# Patient Record
Sex: FEMALE | Race: WHITE | URBAN - NONMETROPOLITAN AREA
[De-identification: names, ages, dates, MRNs, and addresses within clinical notes are randomized per-mention and may not be internally consistent; named-entity substitution may affect disease eponyms.]

---

## 2017-01-02 ENCOUNTER — COMMUNICATION - GICH (OUTPATIENT)
Dept: SURGERY | Facility: OTHER | Age: 62
End: 2017-01-02

## 2017-01-09 ENCOUNTER — HISTORY (OUTPATIENT)
Dept: SURGERY | Facility: OTHER | Age: 62
End: 2017-01-09

## 2017-01-09 ENCOUNTER — OFFICE VISIT - GICH (OUTPATIENT)
Dept: SURGERY | Facility: OTHER | Age: 62
End: 2017-01-09

## 2017-01-09 ENCOUNTER — ANTICOAGULATION - GICH (OUTPATIENT)
Dept: INTERNAL MEDICINE | Facility: OTHER | Age: 62
End: 2017-01-09

## 2017-01-09 DIAGNOSIS — I83.029 VARICOSE VEINS OF LEFT LOWER EXTREMITY WITH ULCER (H): ICD-10-CM

## 2017-01-09 DIAGNOSIS — L97.929 VARICOSE VEINS OF LEFT LOWER EXTREMITY WITH ULCER (H): ICD-10-CM

## 2017-01-09 DIAGNOSIS — Z79.01 LONG TERM CURRENT USE OF ANTICOAGULANT: ICD-10-CM

## 2017-01-09 LAB — INR - HISTORICAL: 2.2

## 2017-01-09 ASSESSMENT — PATIENT HEALTH QUESTIONNAIRE - PHQ9: SUM OF ALL RESPONSES TO PHQ QUESTIONS 1-9: 7

## 2017-01-25 ENCOUNTER — COMMUNICATION - GICH (OUTPATIENT)
Dept: SURGERY | Facility: OTHER | Age: 62
End: 2017-01-25

## 2017-01-30 ENCOUNTER — COMMUNICATION - GICH (OUTPATIENT)
Dept: SURGERY | Facility: OTHER | Age: 62
End: 2017-01-30

## 2017-02-01 ENCOUNTER — OFFICE VISIT - GICH (OUTPATIENT)
Dept: SURGERY | Facility: OTHER | Age: 62
End: 2017-02-01

## 2017-02-01 ENCOUNTER — ANTICOAGULATION - GICH (OUTPATIENT)
Dept: INTERNAL MEDICINE | Facility: OTHER | Age: 62
End: 2017-02-01

## 2017-02-01 ENCOUNTER — COMMUNICATION - GICH (OUTPATIENT)
Dept: FAMILY MEDICINE | Facility: OTHER | Age: 62
End: 2017-02-01

## 2017-02-01 ENCOUNTER — HISTORY (OUTPATIENT)
Dept: SURGERY | Facility: OTHER | Age: 62
End: 2017-02-01

## 2017-02-01 DIAGNOSIS — I82.403 ACUTE EMBOLISM AND THROMBOSIS OF DEEP VEIN OF BOTH LOWER EXTREMITIES (H): ICD-10-CM

## 2017-02-01 DIAGNOSIS — I87.8 OTHER SPECIFIED DISORDERS OF VEINS: ICD-10-CM

## 2017-02-01 DIAGNOSIS — Z79.01 LONG TERM CURRENT USE OF ANTICOAGULANT: ICD-10-CM

## 2017-02-01 LAB — INR - HISTORICAL: 1.6

## 2017-02-01 ASSESSMENT — PATIENT HEALTH QUESTIONNAIRE - PHQ9: SUM OF ALL RESPONSES TO PHQ QUESTIONS 1-9: 0

## 2017-02-15 ENCOUNTER — HOSPITAL ENCOUNTER (OUTPATIENT)
Dept: RADIOLOGY | Facility: OTHER | Age: 62
End: 2017-02-15
Attending: FAMILY MEDICINE

## 2017-02-15 ENCOUNTER — OFFICE VISIT - GICH (OUTPATIENT)
Dept: FAMILY MEDICINE | Facility: OTHER | Age: 62
End: 2017-02-15

## 2017-02-15 ENCOUNTER — ANTICOAGULATION - GICH (OUTPATIENT)
Dept: INTERNAL MEDICINE | Facility: OTHER | Age: 62
End: 2017-02-15

## 2017-02-15 ENCOUNTER — HISTORY (OUTPATIENT)
Dept: FAMILY MEDICINE | Facility: OTHER | Age: 62
End: 2017-02-15

## 2017-02-15 DIAGNOSIS — I82.4Y3 ACUTE EMBOLISM AND THROMBOSIS OF DEEP VEIN OF BOTH PROXIMAL LOWER EXTREMITIES (H): ICD-10-CM

## 2017-02-15 DIAGNOSIS — R79.1 ABNORMAL COAGULATION PROFILE: ICD-10-CM

## 2017-02-15 DIAGNOSIS — I77.6 ARTERITIS (H): ICD-10-CM

## 2017-02-15 DIAGNOSIS — Z79.01 LONG TERM CURRENT USE OF ANTICOAGULANT: ICD-10-CM

## 2017-02-15 DIAGNOSIS — M21.372 LEFT FOOT DROP: ICD-10-CM

## 2017-02-15 DIAGNOSIS — Z86.2 PERSONAL HISTORY OF DISEASES OF THE BLOOD AND BLOOD-FORMING ORGANS AND CERTAIN DISORDERS INVOLVING THE IMMUNE MECHANISM (CODE): ICD-10-CM

## 2017-02-15 DIAGNOSIS — M21.371 RIGHT FOOT DROP: ICD-10-CM

## 2017-02-15 DIAGNOSIS — S69.92XA UNSPECIFIED INJURY OF LEFT WRIST, HAND AND FINGER(S), INITIAL ENCOUNTER: ICD-10-CM

## 2017-02-15 DIAGNOSIS — I83.029 VARICOSE VEINS OF LEFT LOWER EXTREMITY WITH ULCER (H): ICD-10-CM

## 2017-02-15 DIAGNOSIS — M18.30 POST-TRAUMATIC OSTEOARTHRITIS OF FIRST CARPOMETACARPAL JOINT OF ONE HAND: ICD-10-CM

## 2017-02-15 DIAGNOSIS — G89.4 CHRONIC PAIN SYNDROME: ICD-10-CM

## 2017-02-15 DIAGNOSIS — L97.929 VARICOSE VEINS OF LEFT LOWER EXTREMITY WITH ULCER (H): ICD-10-CM

## 2017-02-15 DIAGNOSIS — I82.403 ACUTE EMBOLISM AND THROMBOSIS OF DEEP VEIN OF BOTH LOWER EXTREMITIES (H): ICD-10-CM

## 2017-02-15 LAB
ABSOLUTE BASOPHILS - HISTORICAL: 0.1 THOU/CU MM
ABSOLUTE EOSINOPHILS - HISTORICAL: 0.1 THOU/CU MM
ABSOLUTE LYMPHOCYTES - HISTORICAL: 2.1 THOU/CU MM (ref 0.9–2.9)
ABSOLUTE MONOCYTES - HISTORICAL: 0.7 THOU/CU MM
ABSOLUTE NEUTROPHILS - HISTORICAL: 6.9 THOU/CU MM (ref 1.7–7)
BASOPHILS # BLD AUTO: 1.1 %
C-REACTIVE PROTEIN - HISTORICAL: <1 MG/DL
EOSINOPHIL NFR BLD AUTO: 1.5 %
ERYTHROCYTE [DISTWIDTH] IN BLOOD BY AUTOMATED COUNT: 13.5 % (ref 11.5–15.5)
ERYTHROCYTE [SEDIMENTATION RATE] IN BLOOD: 8 MM/HR
HCT VFR BLD AUTO: 46.6 % (ref 33–51)
HEMOGLOBIN: 14.9 G/DL (ref 12–16)
INR - HISTORICAL: 6.4
INR - HISTORICAL: 7.1
LYMPHOCYTES NFR BLD AUTO: 21.1 % (ref 20–44)
MCH RBC QN AUTO: 29.7 PG (ref 26–34)
MCHC RBC AUTO-ENTMCNC: 32 G/DL (ref 32–36)
MCV RBC AUTO: 93 FL (ref 80–100)
MONOCYTES NFR BLD AUTO: 6.7 %
NEUTROPHILS NFR BLD AUTO: 69.6 % (ref 42–72)
PLATELET # BLD AUTO: 360 THOU/CU MM (ref 140–440)
PMV BLD: 6.5 FL (ref 6.5–11)
PROTIME - HISTORICAL: 80.6 SEC (ref 11.9–15.2)
RED BLOOD COUNT - HISTORICAL: 5.02 MIL/CU MM (ref 4–5.2)
WHITE BLOOD COUNT - HISTORICAL: 9.9 THOU/CU MM (ref 4.5–11)

## 2017-02-20 ENCOUNTER — ANTICOAGULATION - GICH (OUTPATIENT)
Dept: INTERNAL MEDICINE | Facility: OTHER | Age: 62
End: 2017-02-20

## 2017-02-20 DIAGNOSIS — Z79.01 LONG TERM CURRENT USE OF ANTICOAGULANT: ICD-10-CM

## 2017-02-20 LAB — INR - HISTORICAL: 1

## 2017-02-27 ENCOUNTER — ANTICOAGULATION - GICH (OUTPATIENT)
Dept: INTERNAL MEDICINE | Facility: OTHER | Age: 62
End: 2017-02-27

## 2017-02-27 DIAGNOSIS — I82.403 ACUTE EMBOLISM AND THROMBOSIS OF DEEP VEIN OF BOTH LOWER EXTREMITIES (H): ICD-10-CM

## 2017-02-27 DIAGNOSIS — Z79.01 LONG TERM CURRENT USE OF ANTICOAGULANT: ICD-10-CM

## 2017-02-27 LAB — INR - HISTORICAL: 1.3

## 2017-03-02 ENCOUNTER — OFFICE VISIT - GICH (OUTPATIENT)
Dept: FAMILY MEDICINE | Facility: OTHER | Age: 62
End: 2017-03-02

## 2017-03-02 ENCOUNTER — HISTORY (OUTPATIENT)
Dept: FAMILY MEDICINE | Facility: OTHER | Age: 62
End: 2017-03-02

## 2017-03-02 DIAGNOSIS — Z79.01 LONG TERM CURRENT USE OF ANTICOAGULANT: ICD-10-CM

## 2017-03-02 DIAGNOSIS — M21.372 LEFT FOOT DROP: ICD-10-CM

## 2017-03-02 DIAGNOSIS — I82.4Y3 ACUTE EMBOLISM AND THROMBOSIS OF DEEP VEIN OF BOTH PROXIMAL LOWER EXTREMITIES (H): ICD-10-CM

## 2017-03-07 ENCOUNTER — ANTICOAGULATION - GICH (OUTPATIENT)
Dept: INTERNAL MEDICINE | Facility: OTHER | Age: 62
End: 2017-03-07

## 2017-03-07 DIAGNOSIS — Z79.01 LONG TERM CURRENT USE OF ANTICOAGULANT: ICD-10-CM

## 2017-03-07 DIAGNOSIS — I82.4Y3 ACUTE EMBOLISM AND THROMBOSIS OF DEEP VEIN OF BOTH PROXIMAL LOWER EXTREMITIES (H): ICD-10-CM

## 2017-03-07 LAB — INR - HISTORICAL: 4.2

## 2017-03-10 ENCOUNTER — AMBULATORY - GICH (OUTPATIENT)
Dept: SCHEDULING | Facility: OTHER | Age: 62
End: 2017-03-10

## 2017-03-17 ENCOUNTER — ANTICOAGULATION - GICH (OUTPATIENT)
Dept: INTERNAL MEDICINE | Facility: OTHER | Age: 62
End: 2017-03-17

## 2017-03-17 DIAGNOSIS — Z79.01 LONG TERM CURRENT USE OF ANTICOAGULANT: ICD-10-CM

## 2017-03-17 DIAGNOSIS — I82.4Y3 ACUTE EMBOLISM AND THROMBOSIS OF DEEP VEIN OF BOTH PROXIMAL LOWER EXTREMITIES (H): ICD-10-CM

## 2017-03-17 LAB — INR - HISTORICAL: 1.9

## 2017-03-28 ENCOUNTER — COMMUNICATION - GICH (OUTPATIENT)
Dept: FAMILY MEDICINE | Facility: OTHER | Age: 62
End: 2017-03-28

## 2017-03-28 DIAGNOSIS — K52.9 NONINFECTIVE GASTROENTERITIS AND COLITIS: ICD-10-CM

## 2017-04-04 ENCOUNTER — HISTORY (OUTPATIENT)
Dept: FAMILY MEDICINE | Facility: OTHER | Age: 62
End: 2017-04-04

## 2017-04-04 ENCOUNTER — ANTICOAGULATION - GICH (OUTPATIENT)
Dept: INTERNAL MEDICINE | Facility: OTHER | Age: 62
End: 2017-04-04

## 2017-04-04 ENCOUNTER — OFFICE VISIT - GICH (OUTPATIENT)
Dept: FAMILY MEDICINE | Facility: OTHER | Age: 62
End: 2017-04-04

## 2017-04-04 DIAGNOSIS — Z79.01 LONG TERM CURRENT USE OF ANTICOAGULANT: ICD-10-CM

## 2017-04-04 DIAGNOSIS — G89.4 CHRONIC PAIN SYNDROME: ICD-10-CM

## 2017-04-04 DIAGNOSIS — I77.6 ARTERITIS (H): ICD-10-CM

## 2017-04-04 DIAGNOSIS — F41.9 ANXIETY DISORDER: ICD-10-CM

## 2017-04-04 DIAGNOSIS — I82.4Y3 ACUTE EMBOLISM AND THROMBOSIS OF DEEP VEIN OF BOTH PROXIMAL LOWER EXTREMITIES (H): ICD-10-CM

## 2017-04-04 DIAGNOSIS — I82.403 ACUTE EMBOLISM AND THROMBOSIS OF DEEP VEIN OF BOTH LOWER EXTREMITIES (H): ICD-10-CM

## 2017-04-04 DIAGNOSIS — K21.9 GASTRO-ESOPHAGEAL REFLUX DISEASE WITHOUT ESOPHAGITIS: ICD-10-CM

## 2017-04-04 LAB — INR - HISTORICAL: 3.2

## 2017-04-05 ENCOUNTER — OFFICE VISIT - GICH (OUTPATIENT)
Dept: SURGERY | Facility: OTHER | Age: 62
End: 2017-04-05

## 2017-04-05 DIAGNOSIS — I83.029 VARICOSE VEINS OF LEFT LOWER EXTREMITY WITH ULCER (H): ICD-10-CM

## 2017-04-05 DIAGNOSIS — L03.116 CELLULITIS OF LEFT LOWER EXTREMITY: ICD-10-CM

## 2017-04-05 DIAGNOSIS — L97.929 VARICOSE VEINS OF LEFT LOWER EXTREMITY WITH ULCER (H): ICD-10-CM

## 2017-04-05 DIAGNOSIS — L97.929 NON-PRESSURE CHRONIC ULCER OF LEFT LOWER LEG (H): ICD-10-CM

## 2017-04-07 LAB
CULTURE - HISTORICAL: ABNORMAL
CULTURE - HISTORICAL: ABNORMAL
GRAM STAIN: ABNORMAL
SPECIMEN DESCRIPTION - HISTORICAL: ABNORMAL
SUSCEPTIBILITY RESULT - HISTORICAL: ABNORMAL

## 2017-04-10 ENCOUNTER — AMBULATORY - GICH (OUTPATIENT)
Dept: SCHEDULING | Facility: OTHER | Age: 62
End: 2017-04-10

## 2017-04-10 ENCOUNTER — OFFICE VISIT - GICH (OUTPATIENT)
Dept: SURGERY | Facility: OTHER | Age: 62
End: 2017-04-10

## 2017-04-10 ENCOUNTER — COMMUNICATION - GICH (OUTPATIENT)
Dept: FAMILY MEDICINE | Facility: OTHER | Age: 62
End: 2017-04-10

## 2017-04-10 ENCOUNTER — HISTORY (OUTPATIENT)
Dept: SURGERY | Facility: OTHER | Age: 62
End: 2017-04-10

## 2017-04-10 DIAGNOSIS — I83.029 VARICOSE VEINS OF LEFT LOWER EXTREMITY WITH ULCER (H): ICD-10-CM

## 2017-04-10 DIAGNOSIS — L97.929 VARICOSE VEINS OF LEFT LOWER EXTREMITY WITH ULCER (H): ICD-10-CM

## 2017-04-10 DIAGNOSIS — F32.9 MAJOR DEPRESSIVE DISORDER, SINGLE EPISODE: ICD-10-CM

## 2017-04-14 ENCOUNTER — AMBULATORY - GICH (OUTPATIENT)
Dept: SCHEDULING | Facility: OTHER | Age: 62
End: 2017-04-14

## 2017-04-16 ENCOUNTER — HISTORY (OUTPATIENT)
Dept: EMERGENCY MEDICINE | Facility: OTHER | Age: 62
End: 2017-04-16

## 2017-04-17 ENCOUNTER — HISTORY (OUTPATIENT)
Dept: INTENSIVE CARE | Facility: OTHER | Age: 62
End: 2017-04-17

## 2017-04-23 ENCOUNTER — COMMUNICATION - GICH (OUTPATIENT)
Dept: FAMILY MEDICINE | Facility: OTHER | Age: 62
End: 2017-04-23

## 2017-04-23 DIAGNOSIS — R60.9 EDEMA: ICD-10-CM

## 2017-04-23 DIAGNOSIS — K52.9 NONINFECTIVE GASTROENTERITIS AND COLITIS: ICD-10-CM

## 2017-04-23 DIAGNOSIS — F32.9 MAJOR DEPRESSIVE DISORDER, SINGLE EPISODE: ICD-10-CM

## 2017-04-23 DIAGNOSIS — K21.9 GASTRO-ESOPHAGEAL REFLUX DISEASE WITHOUT ESOPHAGITIS: ICD-10-CM

## 2017-04-24 ENCOUNTER — COMMUNICATION - GICH (OUTPATIENT)
Dept: FAMILY MEDICINE | Facility: OTHER | Age: 62
End: 2017-04-24

## 2017-05-01 ENCOUNTER — OFFICE VISIT - GICH (OUTPATIENT)
Dept: FAMILY MEDICINE | Facility: OTHER | Age: 62
End: 2017-05-01

## 2017-05-01 ENCOUNTER — OFFICE VISIT - GICH (OUTPATIENT)
Dept: SURGERY | Facility: OTHER | Age: 62
End: 2017-05-01

## 2017-05-01 ENCOUNTER — HISTORY (OUTPATIENT)
Dept: SURGERY | Facility: OTHER | Age: 62
End: 2017-05-01

## 2017-05-01 ENCOUNTER — HISTORY (OUTPATIENT)
Dept: FAMILY MEDICINE | Facility: OTHER | Age: 62
End: 2017-05-01

## 2017-05-01 ENCOUNTER — AMBULATORY - GICH (OUTPATIENT)
Dept: SCHEDULING | Facility: OTHER | Age: 62
End: 2017-05-01

## 2017-05-01 ENCOUNTER — ANTICOAGULATION - GICH (OUTPATIENT)
Dept: FAMILY MEDICINE | Facility: OTHER | Age: 62
End: 2017-05-01

## 2017-05-01 DIAGNOSIS — R00.0 TACHYCARDIA: ICD-10-CM

## 2017-05-01 DIAGNOSIS — L97.929 VARICOSE VEINS OF LEFT LOWER EXTREMITY WITH ULCER (H): ICD-10-CM

## 2017-05-01 DIAGNOSIS — T79.6XXS TRAUMATIC ISCHEMIA OF MUSCLE, SEQUELA: ICD-10-CM

## 2017-05-01 DIAGNOSIS — I82.403 ACUTE EMBOLISM AND THROMBOSIS OF DEEP VEIN OF BOTH LOWER EXTREMITIES (H): ICD-10-CM

## 2017-05-01 DIAGNOSIS — E87.1 HYPO-OSMOLALITY AND HYPONATREMIA (CODE): ICD-10-CM

## 2017-05-01 DIAGNOSIS — Z79.01 LONG TERM CURRENT USE OF ANTICOAGULANT: ICD-10-CM

## 2017-05-01 DIAGNOSIS — I82.4Y3 ACUTE EMBOLISM AND THROMBOSIS OF DEEP VEIN OF BOTH PROXIMAL LOWER EXTREMITIES (H): ICD-10-CM

## 2017-05-01 DIAGNOSIS — I83.029 VARICOSE VEINS OF LEFT LOWER EXTREMITY WITH ULCER (H): ICD-10-CM

## 2017-05-01 LAB
ANION GAP - HISTORICAL: 7 (ref 5–18)
BUN SERPL-MCNC: 15 MG/DL (ref 7–25)
BUN/CREAT RATIO - HISTORICAL: 22
CALCIUM SERPL-MCNC: 9.4 MG/DL (ref 8.6–10.3)
CHLORIDE SERPLBLD-SCNC: 102 MMOL/L (ref 98–107)
CK SERPL-CCNC: 112 IU/L (ref 30–223)
CO2 SERPL-SCNC: 26 MMOL/L (ref 21–31)
CREAT SERPL-MCNC: 0.67 MG/DL (ref 0.7–1.3)
GFR IF NOT AFRICAN AMERICAN - HISTORICAL: >60 ML/MIN/1.73M2
GLUCOSE SERPL-MCNC: 104 MG/DL (ref 70–105)
INR - HISTORICAL: 1.6
MAGNESIUM SERPL-MCNC: 1.7 MG/DL (ref 1.9–2.7)
POTASSIUM SERPL-SCNC: 4.8 MMOL/L (ref 3.5–5.1)
PROTIME - HISTORICAL: 17.4 SEC (ref 11.9–15.2)
SODIUM SERPL-SCNC: 135 MMOL/L (ref 133–143)

## 2017-05-02 ENCOUNTER — COMMUNICATION - GICH (OUTPATIENT)
Dept: FAMILY MEDICINE | Facility: OTHER | Age: 62
End: 2017-05-02

## 2017-05-08 ENCOUNTER — OFFICE VISIT - GICH (OUTPATIENT)
Dept: SURGERY | Facility: OTHER | Age: 62
End: 2017-05-08

## 2017-05-08 ENCOUNTER — COMMUNICATION - GICH (OUTPATIENT)
Dept: FAMILY MEDICINE | Facility: OTHER | Age: 62
End: 2017-05-08

## 2017-05-08 ENCOUNTER — ANTICOAGULATION - GICH (OUTPATIENT)
Dept: INTERNAL MEDICINE | Facility: OTHER | Age: 62
End: 2017-05-08

## 2017-05-08 ENCOUNTER — HISTORY (OUTPATIENT)
Dept: SURGERY | Facility: OTHER | Age: 62
End: 2017-05-08

## 2017-05-08 DIAGNOSIS — I82.4Y3 ACUTE EMBOLISM AND THROMBOSIS OF DEEP VEIN OF BOTH PROXIMAL LOWER EXTREMITIES (H): ICD-10-CM

## 2017-05-08 DIAGNOSIS — R00.0 TACHYCARDIA: ICD-10-CM

## 2017-05-08 DIAGNOSIS — L97.929 VARICOSE VEINS OF LEFT LOWER EXTREMITY WITH ULCER (H): ICD-10-CM

## 2017-05-08 DIAGNOSIS — I83.029 VARICOSE VEINS OF LEFT LOWER EXTREMITY WITH ULCER (H): ICD-10-CM

## 2017-05-08 DIAGNOSIS — I82.403 ACUTE EMBOLISM AND THROMBOSIS OF DEEP VEIN OF BOTH LOWER EXTREMITIES (H): ICD-10-CM

## 2017-05-08 DIAGNOSIS — Z79.01 LONG TERM CURRENT USE OF ANTICOAGULANT: ICD-10-CM

## 2017-05-08 LAB — INR - HISTORICAL: 4.1

## 2017-05-09 ENCOUNTER — COMMUNICATION - GICH (OUTPATIENT)
Dept: FAMILY MEDICINE | Facility: OTHER | Age: 62
End: 2017-05-09

## 2017-05-15 ENCOUNTER — OFFICE VISIT - GICH (OUTPATIENT)
Dept: SURGERY | Facility: OTHER | Age: 62
End: 2017-05-15

## 2017-05-15 ENCOUNTER — HISTORY (OUTPATIENT)
Dept: SURGERY | Facility: OTHER | Age: 62
End: 2017-05-15

## 2017-05-15 ENCOUNTER — AMBULATORY - GICH (OUTPATIENT)
Dept: SCHEDULING | Facility: OTHER | Age: 62
End: 2017-05-15

## 2017-05-15 DIAGNOSIS — L97.929 VARICOSE VEINS OF LEFT LOWER EXTREMITY WITH ULCER (H): ICD-10-CM

## 2017-05-15 DIAGNOSIS — I83.029 VARICOSE VEINS OF LEFT LOWER EXTREMITY WITH ULCER (H): ICD-10-CM

## 2017-05-16 ENCOUNTER — HOSPITAL - GICH (OUTPATIENT)
Dept: LAB | Facility: OTHER | Age: 62
End: 2017-05-16

## 2017-05-16 ENCOUNTER — ANTICOAGULATION - GICH (OUTPATIENT)
Dept: FAMILY MEDICINE | Facility: OTHER | Age: 62
End: 2017-05-16

## 2017-05-16 DIAGNOSIS — Z00.00 ENCOUNTER FOR GENERAL ADULT MEDICAL EXAMINATION WITHOUT ABNORMAL FINDINGS: ICD-10-CM

## 2017-05-16 DIAGNOSIS — I82.403 ACUTE EMBOLISM AND THROMBOSIS OF DEEP VEIN OF BOTH LOWER EXTREMITIES (H): ICD-10-CM

## 2017-05-16 DIAGNOSIS — Z79.01 LONG TERM CURRENT USE OF ANTICOAGULANT: ICD-10-CM

## 2017-05-16 LAB
INR - HISTORICAL: 5.4
PROTIME - HISTORICAL: 60.5 SEC (ref 11.9–15.2)

## 2017-05-18 ENCOUNTER — AMBULATORY - GICH (OUTPATIENT)
Dept: SCHEDULING | Facility: OTHER | Age: 62
End: 2017-05-18

## 2017-05-18 ENCOUNTER — HOSPITAL - GICH (OUTPATIENT)
Dept: LAB | Facility: OTHER | Age: 62
End: 2017-05-18

## 2017-05-18 ENCOUNTER — ANTICOAGULATION - GICH (OUTPATIENT)
Dept: FAMILY MEDICINE | Facility: OTHER | Age: 62
End: 2017-05-18

## 2017-05-18 DIAGNOSIS — I82.403 ACUTE EMBOLISM AND THROMBOSIS OF DEEP VEIN OF BOTH LOWER EXTREMITIES (H): ICD-10-CM

## 2017-05-18 DIAGNOSIS — Z79.01 LONG TERM CURRENT USE OF ANTICOAGULANT: ICD-10-CM

## 2017-05-18 DIAGNOSIS — I82.4Y3 ACUTE EMBOLISM AND THROMBOSIS OF DEEP VEIN OF BOTH PROXIMAL LOWER EXTREMITIES (H): ICD-10-CM

## 2017-05-18 LAB
INR - HISTORICAL: 2.1
PROTIME - HISTORICAL: 22.7 SEC (ref 11.9–15.2)

## 2017-05-23 ENCOUNTER — HOSPITAL - GICH (OUTPATIENT)
Dept: LAB | Facility: OTHER | Age: 62
End: 2017-05-23

## 2017-05-23 ENCOUNTER — ANTICOAGULATION - GICH (OUTPATIENT)
Dept: FAMILY MEDICINE | Facility: OTHER | Age: 62
End: 2017-05-23

## 2017-05-23 ENCOUNTER — AMBULATORY - GICH (OUTPATIENT)
Dept: SCHEDULING | Facility: OTHER | Age: 62
End: 2017-05-23

## 2017-05-23 DIAGNOSIS — Z79.01 LONG TERM CURRENT USE OF ANTICOAGULANT: ICD-10-CM

## 2017-05-23 DIAGNOSIS — I82.4Y3 ACUTE EMBOLISM AND THROMBOSIS OF DEEP VEIN OF BOTH PROXIMAL LOWER EXTREMITIES (H): ICD-10-CM

## 2017-05-23 DIAGNOSIS — I77.3 ARTERIAL FIBROMUSCULAR DYSPLASIA (H): ICD-10-CM

## 2017-05-23 DIAGNOSIS — I82.403 ACUTE EMBOLISM AND THROMBOSIS OF DEEP VEIN OF BOTH LOWER EXTREMITIES (H): ICD-10-CM

## 2017-05-23 LAB
INR - HISTORICAL: 4.5
PROTIME - HISTORICAL: 50.1 SEC (ref 11.9–15.2)

## 2017-05-24 ENCOUNTER — HISTORY (OUTPATIENT)
Dept: INTERNAL MEDICINE | Facility: OTHER | Age: 62
End: 2017-05-24

## 2017-05-24 ENCOUNTER — OFFICE VISIT - GICH (OUTPATIENT)
Dept: INTERNAL MEDICINE | Facility: OTHER | Age: 62
End: 2017-05-24

## 2017-05-24 DIAGNOSIS — I87.8 OTHER SPECIFIED DISORDERS OF VEINS: ICD-10-CM

## 2017-05-24 DIAGNOSIS — L97.922 NON-PRESSURE CHRONIC ULCER OF LEFT LOWER LEG WITH FAT LAYER EXPOSED (H): ICD-10-CM

## 2017-05-31 ENCOUNTER — OFFICE VISIT - GICH (OUTPATIENT)
Dept: FAMILY MEDICINE | Facility: OTHER | Age: 62
End: 2017-05-31

## 2017-05-31 ENCOUNTER — HISTORY (OUTPATIENT)
Dept: SURGERY | Facility: OTHER | Age: 62
End: 2017-05-31

## 2017-05-31 ENCOUNTER — ANTICOAGULATION - GICH (OUTPATIENT)
Dept: INTERNAL MEDICINE | Facility: OTHER | Age: 62
End: 2017-05-31

## 2017-05-31 ENCOUNTER — HISTORY (OUTPATIENT)
Dept: FAMILY MEDICINE | Facility: OTHER | Age: 62
End: 2017-05-31

## 2017-05-31 ENCOUNTER — OFFICE VISIT - GICH (OUTPATIENT)
Dept: SURGERY | Facility: OTHER | Age: 62
End: 2017-05-31

## 2017-05-31 DIAGNOSIS — I77.6 ARTERITIS (H): ICD-10-CM

## 2017-05-31 DIAGNOSIS — I82.403 ACUTE EMBOLISM AND THROMBOSIS OF DEEP VEIN OF BOTH LOWER EXTREMITIES (H): ICD-10-CM

## 2017-05-31 DIAGNOSIS — L97.929 VARICOSE VEINS OF LEFT LOWER EXTREMITY WITH ULCER (H): ICD-10-CM

## 2017-05-31 DIAGNOSIS — R60.9 EDEMA: ICD-10-CM

## 2017-05-31 DIAGNOSIS — Z02.89 ENCOUNTER FOR OTHER ADMINISTRATIVE EXAMINATIONS: ICD-10-CM

## 2017-05-31 DIAGNOSIS — K21.9 GASTRO-ESOPHAGEAL REFLUX DISEASE WITHOUT ESOPHAGITIS: ICD-10-CM

## 2017-05-31 DIAGNOSIS — F41.9 ANXIETY DISORDER: ICD-10-CM

## 2017-05-31 DIAGNOSIS — F32.9 MAJOR DEPRESSIVE DISORDER, SINGLE EPISODE: ICD-10-CM

## 2017-05-31 DIAGNOSIS — Z79.01 LONG TERM CURRENT USE OF ANTICOAGULANT: ICD-10-CM

## 2017-05-31 DIAGNOSIS — I83.029 VARICOSE VEINS OF LEFT LOWER EXTREMITY WITH ULCER (H): ICD-10-CM

## 2017-05-31 DIAGNOSIS — Z86.2 PERSONAL HISTORY OF DISEASES OF THE BLOOD AND BLOOD-FORMING ORGANS AND CERTAIN DISORDERS INVOLVING THE IMMUNE MECHANISM (CODE): ICD-10-CM

## 2017-05-31 LAB
INR - HISTORICAL: 8.8
PROTIME - HISTORICAL: 101.6 SEC (ref 11.9–15.2)

## 2017-06-01 ENCOUNTER — HOSPITAL ENCOUNTER (OUTPATIENT)
Dept: MEDSURG UNIT | Facility: OTHER | Age: 62
Setting detail: OBSERVATION
Discharge: NURSING, REHAB OR LONG-TERM CARE | End: 2017-06-03
Attending: FAMILY MEDICINE | Admitting: INTERNAL MEDICINE

## 2017-06-01 ENCOUNTER — HISTORY (OUTPATIENT)
Dept: MEDSURG UNIT | Facility: OTHER | Age: 62
End: 2017-06-01

## 2017-06-01 DIAGNOSIS — F32.9 MAJOR DEPRESSIVE DISORDER, SINGLE EPISODE: ICD-10-CM
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
## 2017-06-01 DIAGNOSIS — I82.4Y3 ACUTE EMBOLISM AND THROMBOSIS OF DEEP VEIN OF BOTH PROXIMAL LOWER EXTREMITIES (H): ICD-10-CM

## 2017-06-01 DIAGNOSIS — R79.1 ABNORMAL COAGULATION PROFILE: ICD-10-CM

## 2017-06-01 DIAGNOSIS — I77.6 ARTERITIS (H): ICD-10-CM
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
## 2017-06-01 DIAGNOSIS — Z79.01 LONG TERM CURRENT USE OF ANTICOAGULANT: ICD-10-CM

## 2017-06-01 DIAGNOSIS — M79.605 PAIN OF LEFT LEG: ICD-10-CM

## 2017-06-01 DIAGNOSIS — I47.9 PAROXYSMAL TACHYCARDIA (H): ICD-10-CM

## 2017-06-01 DIAGNOSIS — K52.9 NONINFECTIVE GASTROENTERITIS AND COLITIS: ICD-10-CM

## 2017-06-01 DIAGNOSIS — F41.9 ANXIETY DISORDER: ICD-10-CM

## 2017-06-01 LAB
A/G RATIO - HISTORICAL: 1.3 (ref 1–2)
ABSOLUTE BASOPHILS - HISTORICAL: 0.1 THOU/CU MM
ABSOLUTE EOSINOPHILS - HISTORICAL: 0.1 THOU/CU MM
ABSOLUTE LYMPHOCYTES - HISTORICAL: 1.3 THOU/CU MM (ref 0.9–2.9)
ABSOLUTE MONOCYTES - HISTORICAL: 0.7 THOU/CU MM
ABSOLUTE NEUTROPHILS - HISTORICAL: 6.2 THOU/CU MM (ref 1.7–7)
ALBUMIN SERPL-MCNC: 4 G/DL (ref 3.5–5.7)
ALP SERPL-CCNC: 104 IU/L (ref 34–104)
ALT (SGPT) - HISTORICAL: 30 IU/L (ref 7–52)
AMPHETAMINES QUAL: ABNORMAL
ANION GAP - HISTORICAL: 10 (ref 5–18)
AST SERPL-CCNC: 58 IU/L (ref 13–39)
BACTERIA URINE: ABNORMAL BACTERIA/HPF
BARBITURATES QUAL UR: NOT DETECTED
BASOPHILS # BLD AUTO: 0.6 %
BENZODIAZEPINES QUAL: ABNORMAL
BILIRUB SERPL-MCNC: 0.5 MG/DL (ref 0.3–1)
BILIRUB UR QL: NEGATIVE
BUN SERPL-MCNC: 24 MG/DL (ref 7–25)
BUN/CREAT RATIO - HISTORICAL: 33
BUPRENORPHINE QUAL URINE: NOT DETECTED
CALCIUM SERPL-MCNC: 9.5 MG/DL (ref 8.6–10.3)
CHLORIDE SERPLBLD-SCNC: 100 MMOL/L (ref 98–107)
CLARITY, URINE: CLEAR CLARITY
CO2 SERPL-SCNC: 22 MMOL/L (ref 21–31)
COCAINE QUAL: NOT DETECTED
COLOR UR: YELLOW COLOR
CREAT SERPL-MCNC: 0.72 MG/DL (ref 0.7–1.3)
EOSINOPHIL NFR BLD AUTO: 0.6 %
EPITHELIAL CELLS: ABNORMAL EPI/HPF
ERYTHROCYTE [DISTWIDTH] IN BLOOD BY AUTOMATED COUNT: 14.1 % (ref 11.5–15.5)
ETHANOL: <0.01 G/DL
GFR IF NOT AFRICAN AMERICAN - HISTORICAL: >60 ML/MIN/1.73M2
GLOBULIN - HISTORICAL: 3.1 G/DL (ref 2–3.7)
GLUCOSE SERPL-MCNC: 133 MG/DL (ref 70–105)
GLUCOSE URINE: NEGATIVE MG/DL
HCT VFR BLD AUTO: 42.7 % (ref 33–51)
HEMOGLOBIN: 14.4 G/DL (ref 12–16)
INR - HISTORICAL: 8.4
KETONES UR QL: 15 MG/DL
LEUKOCYTE ESTERASE URINE: NEGATIVE
LYMPHOCYTES NFR BLD AUTO: 15.5 % (ref 20–44)
Lab: <0.2 UG/ML (ref 10–30)
MCH RBC QN AUTO: 29.8 PG (ref 26–34)
MCHC RBC AUTO-ENTMCNC: 33.7 G/DL (ref 32–36)
MCV RBC AUTO: 88 FL (ref 80–100)
METHADONE QUAL URINE: NOT DETECTED
METHAMPHETAMINE QUAL URINE: NOT DETECTED
MONOCYTES NFR BLD AUTO: 8 %
NEUTROPHILS NFR BLD AUTO: 75.2 % (ref 42–72)
NITRITE UR QL STRIP: POSITIVE
OCCULT BLOOD,URINE - HISTORICAL: ABNORMAL
OPIATES QUALITATIVE URINE: ABNORMAL
OXYCODONE QUAL: ABNORMAL
PCP QUAL URINE: NOT DETECTED
PH UR: 5 [PH]
PLATELET # BLD AUTO: 311 THOU/CU MM (ref 140–440)
PMV BLD: 9.4 FL (ref 6.5–11)
POTASSIUM SERPL-SCNC: 4.1 MMOL/L (ref 3.5–5.1)
PROPOXYPHENE,QUAL - HISTORICAL: NOT DETECTED
PROT SERPL-MCNC: 7.1 G/DL (ref 6.4–8.9)
PROTEIN QUALITATIVE,URINE - HISTORICAL: NEGATIVE MG/DL
PROTIME - HISTORICAL: 96.9 SEC (ref 11.9–15.2)
RBC - HISTORICAL: ABNORMAL /HPF
RED BLOOD COUNT - HISTORICAL: 4.84 MIL/CU MM (ref 4–5.2)
SALICYLATES SERPL-MCNC: <0.2 MG/DL (ref 15–30)
SODIUM SERPL-SCNC: 132 MMOL/L (ref 133–143)
SP GR UR STRIP: 1.02
THC METABOLITES,QUAL - HISTORICAL: NOT DETECTED
TRICYC ANTI QUAL URINE: ABNORMAL
UROBILINOGEN,QUALITATIVE - HISTORICAL: NORMAL EU/DL
WBC - HISTORICAL: ABNORMAL /HPF
WHITE BLOOD COUNT - HISTORICAL: 8.3 THOU/CU MM (ref 4.5–11)

## 2017-06-02 ENCOUNTER — HISTORY (OUTPATIENT)
Dept: MEDSURG UNIT | Facility: OTHER | Age: 62
End: 2017-06-02

## 2017-06-03 ENCOUNTER — HISTORY (OUTPATIENT)
Dept: MEDSURG UNIT | Facility: OTHER | Age: 62
End: 2017-06-03

## 2017-06-03 LAB
CK SERPL-CCNC: 6990 IU/L (ref 30–223)
INR - HISTORICAL: 1.3
PROTIME - HISTORICAL: 13.3 SEC (ref 11.9–15.2)

## 2017-06-04 LAB
CULTURE - HISTORICAL: ABNORMAL
CULTURE - HISTORICAL: ABNORMAL
SUSCEPTIBILITY RESULT - HISTORICAL: ABNORMAL

## 2017-06-06 ENCOUNTER — AMBULATORY - GICH (OUTPATIENT)
Dept: SCHEDULING | Facility: OTHER | Age: 62
End: 2017-06-06

## 2017-06-06 ENCOUNTER — ANTICOAGULATION - GICH (OUTPATIENT)
Dept: FAMILY MEDICINE | Facility: OTHER | Age: 62
End: 2017-06-06

## 2017-06-06 ENCOUNTER — HOSPITAL - GICH (OUTPATIENT)
Dept: LAB | Facility: OTHER | Age: 62
End: 2017-06-06

## 2017-06-06 DIAGNOSIS — Z79.01 LONG TERM CURRENT USE OF ANTICOAGULANT: ICD-10-CM

## 2017-06-06 DIAGNOSIS — I82.403 ACUTE EMBOLISM AND THROMBOSIS OF DEEP VEIN OF BOTH LOWER EXTREMITIES (H): ICD-10-CM

## 2017-06-06 DIAGNOSIS — T79.6XXA: ICD-10-CM

## 2017-06-06 DIAGNOSIS — I82.4Y3 ACUTE EMBOLISM AND THROMBOSIS OF DEEP VEIN OF BOTH PROXIMAL LOWER EXTREMITIES (H): ICD-10-CM

## 2017-06-06 LAB
CK SERPL-CCNC: 955 IU/L (ref 30–223)
INR - HISTORICAL: 1.1
PROTIME - HISTORICAL: 11.6 SEC (ref 11.9–15.2)

## 2017-06-09 ENCOUNTER — ANTICOAGULATION - GICH (OUTPATIENT)
Dept: FAMILY MEDICINE | Facility: OTHER | Age: 62
End: 2017-06-09

## 2017-06-09 ENCOUNTER — AMBULATORY - GICH (OUTPATIENT)
Dept: SCHEDULING | Facility: OTHER | Age: 62
End: 2017-06-09

## 2017-06-09 ENCOUNTER — HOSPITAL ENCOUNTER (OUTPATIENT)
Dept: LAB | Facility: OTHER | Age: 62
Setting detail: SPECIMEN
End: 2017-06-09
Attending: FAMILY MEDICINE | Admitting: FAMILY MEDICINE

## 2017-06-09 ENCOUNTER — HOSPITAL - GICH (OUTPATIENT)
Dept: LAB | Facility: OTHER | Age: 62
End: 2017-06-09

## 2017-06-09 DIAGNOSIS — I82.403 ACUTE EMBOLISM AND THROMBOSIS OF DEEP VEIN OF BOTH LOWER EXTREMITIES (H): ICD-10-CM

## 2017-06-09 DIAGNOSIS — T79.6XXA: ICD-10-CM

## 2017-06-09 DIAGNOSIS — I82.4Y3 ACUTE EMBOLISM AND THROMBOSIS OF DEEP VEIN OF BOTH PROXIMAL LOWER EXTREMITIES (H): ICD-10-CM

## 2017-06-09 DIAGNOSIS — Z79.01 LONG TERM CURRENT USE OF ANTICOAGULANT: ICD-10-CM

## 2017-06-09 LAB
INR - HISTORICAL: 2.1
PROTIME - HISTORICAL: 22.9 SEC (ref 11.9–15.2)

## 2017-06-12 ENCOUNTER — HISTORY (OUTPATIENT)
Dept: SURGERY | Facility: OTHER | Age: 62
End: 2017-06-12

## 2017-06-12 ENCOUNTER — ANTICOAGULATION - GICH (OUTPATIENT)
Dept: INTERNAL MEDICINE | Facility: OTHER | Age: 62
End: 2017-06-12

## 2017-06-12 ENCOUNTER — HISTORY (OUTPATIENT)
Dept: FAMILY MEDICINE | Facility: OTHER | Age: 62
End: 2017-06-12

## 2017-06-12 ENCOUNTER — OFFICE VISIT - GICH (OUTPATIENT)
Dept: SURGERY | Facility: OTHER | Age: 62
End: 2017-06-12

## 2017-06-12 ENCOUNTER — OFFICE VISIT - GICH (OUTPATIENT)
Dept: FAMILY MEDICINE | Facility: OTHER | Age: 62
End: 2017-06-12

## 2017-06-12 DIAGNOSIS — L97.929 VARICOSE VEINS OF LEFT LOWER EXTREMITY WITH ULCER (H): ICD-10-CM

## 2017-06-12 DIAGNOSIS — F32.9 MAJOR DEPRESSIVE DISORDER, SINGLE EPISODE: ICD-10-CM

## 2017-06-12 DIAGNOSIS — Z79.01 LONG TERM CURRENT USE OF ANTICOAGULANT: ICD-10-CM

## 2017-06-12 DIAGNOSIS — I82.403 ACUTE EMBOLISM AND THROMBOSIS OF DEEP VEIN OF BOTH LOWER EXTREMITIES (H): ICD-10-CM

## 2017-06-12 DIAGNOSIS — T79.6XXS TRAUMATIC ISCHEMIA OF MUSCLE, SEQUELA: ICD-10-CM

## 2017-06-12 DIAGNOSIS — I77.6 ARTERITIS (H): ICD-10-CM

## 2017-06-12 DIAGNOSIS — I47.9 PAROXYSMAL TACHYCARDIA (H): ICD-10-CM

## 2017-06-12 DIAGNOSIS — I83.029 VARICOSE VEINS OF LEFT LOWER EXTREMITY WITH ULCER (H): ICD-10-CM

## 2017-06-12 DIAGNOSIS — I87.8 OTHER SPECIFIED DISORDERS OF VEINS: ICD-10-CM

## 2017-06-12 DIAGNOSIS — I82.4Y3 ACUTE EMBOLISM AND THROMBOSIS OF DEEP VEIN OF BOTH PROXIMAL LOWER EXTREMITIES (H): ICD-10-CM

## 2017-06-12 LAB — INR - HISTORICAL: 2.1

## 2017-06-13 ENCOUNTER — HISTORY (OUTPATIENT)
Dept: FAMILY MEDICINE | Facility: OTHER | Age: 62
End: 2017-06-13

## 2017-06-19 ENCOUNTER — COMMUNICATION - GICH (OUTPATIENT)
Dept: FAMILY MEDICINE | Facility: OTHER | Age: 62
End: 2017-06-19

## 2017-06-19 ENCOUNTER — HISTORY (OUTPATIENT)
Dept: SURGERY | Facility: OTHER | Age: 62
End: 2017-06-19

## 2017-06-19 ENCOUNTER — OFFICE VISIT - GICH (OUTPATIENT)
Dept: SURGERY | Facility: OTHER | Age: 62
End: 2017-06-19

## 2017-06-19 DIAGNOSIS — L97.929 VARICOSE VEINS OF LEFT LOWER EXTREMITY WITH ULCER (H): ICD-10-CM

## 2017-06-19 DIAGNOSIS — I83.029 VARICOSE VEINS OF LEFT LOWER EXTREMITY WITH ULCER (H): ICD-10-CM

## 2017-06-20 ENCOUNTER — HOSPITAL - GICH (OUTPATIENT)
Dept: LAB | Facility: OTHER | Age: 62
End: 2017-06-20

## 2017-06-20 ENCOUNTER — ANTICOAGULATION - GICH (OUTPATIENT)
Dept: FAMILY MEDICINE | Facility: OTHER | Age: 62
End: 2017-06-20

## 2017-06-20 ENCOUNTER — AMBULATORY - GICH (OUTPATIENT)
Dept: SCHEDULING | Facility: OTHER | Age: 62
End: 2017-06-20

## 2017-06-20 DIAGNOSIS — Z79.01 LONG TERM CURRENT USE OF ANTICOAGULANT: ICD-10-CM

## 2017-06-20 DIAGNOSIS — I48.20 CHRONIC ATRIAL FIBRILLATION (H): ICD-10-CM

## 2017-06-20 LAB
INR - HISTORICAL: 5.8
PROTIME - HISTORICAL: 65.8 SEC (ref 11.9–15.2)

## 2017-06-22 ENCOUNTER — AMBULATORY - GICH (OUTPATIENT)
Dept: SCHEDULING | Facility: OTHER | Age: 62
End: 2017-06-22

## 2017-06-22 ENCOUNTER — ANTICOAGULATION - GICH (OUTPATIENT)
Dept: FAMILY MEDICINE | Facility: OTHER | Age: 62
End: 2017-06-22

## 2017-06-22 ENCOUNTER — HOSPITAL - GICH (OUTPATIENT)
Dept: LAB | Facility: OTHER | Age: 62
End: 2017-06-22

## 2017-06-22 DIAGNOSIS — Z79.01 LONG TERM CURRENT USE OF ANTICOAGULANT: ICD-10-CM

## 2017-06-22 DIAGNOSIS — I82.90 ACUTE EMBOLISM AND THROMBOSIS OF VEIN: ICD-10-CM

## 2017-06-22 LAB
INR - HISTORICAL: 1.9
PROTIME - HISTORICAL: 20.9 SEC (ref 11.9–15.2)

## 2017-06-26 ENCOUNTER — HOSPITAL - GICH (OUTPATIENT)
Dept: LAB | Facility: OTHER | Age: 62
End: 2017-06-26

## 2017-06-26 ENCOUNTER — ANTICOAGULATION - GICH (OUTPATIENT)
Dept: FAMILY MEDICINE | Facility: OTHER | Age: 62
End: 2017-06-26

## 2017-06-26 ENCOUNTER — OFFICE VISIT - GICH (OUTPATIENT)
Dept: INTERNAL MEDICINE | Facility: OTHER | Age: 62
End: 2017-06-26

## 2017-06-26 ENCOUNTER — HISTORY (OUTPATIENT)
Dept: INTERNAL MEDICINE | Facility: OTHER | Age: 62
End: 2017-06-26

## 2017-06-26 ENCOUNTER — AMBULATORY - GICH (OUTPATIENT)
Dept: SCHEDULING | Facility: OTHER | Age: 62
End: 2017-06-26

## 2017-06-26 DIAGNOSIS — Z79.01 LONG TERM CURRENT USE OF ANTICOAGULANT: ICD-10-CM

## 2017-06-26 DIAGNOSIS — T79.6XXD TRAUMATIC ISCHEMIA OF MUSCLE, SUBSEQUENT ENCOUNTER: ICD-10-CM

## 2017-06-26 DIAGNOSIS — L97.922 NON-PRESSURE CHRONIC ULCER OF LEFT LOWER LEG WITH FAT LAYER EXPOSED (H): ICD-10-CM

## 2017-06-26 LAB
INR - HISTORICAL: 3.9
PROTIME - HISTORICAL: 42.9 SEC (ref 11.9–15.2)

## 2017-06-28 ENCOUNTER — HISTORY (OUTPATIENT)
Dept: FAMILY MEDICINE | Facility: OTHER | Age: 62
End: 2017-06-28

## 2017-06-28 ENCOUNTER — OFFICE VISIT - GICH (OUTPATIENT)
Dept: FAMILY MEDICINE | Facility: OTHER | Age: 62
End: 2017-06-28

## 2017-06-28 DIAGNOSIS — Z02.89 ENCOUNTER FOR OTHER ADMINISTRATIVE EXAMINATIONS: ICD-10-CM

## 2017-06-28 DIAGNOSIS — I82.4Y3 ACUTE EMBOLISM AND THROMBOSIS OF DEEP VEIN OF BOTH PROXIMAL LOWER EXTREMITIES (H): ICD-10-CM

## 2017-06-28 DIAGNOSIS — F32.9 MAJOR DEPRESSIVE DISORDER, SINGLE EPISODE: ICD-10-CM

## 2017-06-28 DIAGNOSIS — I77.6 ARTERITIS (H): ICD-10-CM

## 2017-06-28 DIAGNOSIS — G89.4 CHRONIC PAIN SYNDROME: ICD-10-CM

## 2017-06-30 ENCOUNTER — COMMUNICATION - GICH (OUTPATIENT)
Dept: FAMILY MEDICINE | Facility: OTHER | Age: 62
End: 2017-06-30

## 2017-06-30 DIAGNOSIS — F41.9 ANXIETY DISORDER: ICD-10-CM

## 2017-07-05 ENCOUNTER — OFFICE VISIT - GICH (OUTPATIENT)
Dept: SURGERY | Facility: OTHER | Age: 62
End: 2017-07-05

## 2017-07-05 ENCOUNTER — HISTORY (OUTPATIENT)
Dept: SURGERY | Facility: OTHER | Age: 62
End: 2017-07-05

## 2017-07-05 ENCOUNTER — ANTICOAGULATION - GICH (OUTPATIENT)
Dept: INTERNAL MEDICINE | Facility: OTHER | Age: 62
End: 2017-07-05

## 2017-07-05 DIAGNOSIS — L97.929 VARICOSE VEINS OF LEFT LOWER EXTREMITY WITH ULCER (H): ICD-10-CM

## 2017-07-05 DIAGNOSIS — I82.4Y3 ACUTE EMBOLISM AND THROMBOSIS OF DEEP VEIN OF BOTH PROXIMAL LOWER EXTREMITIES (H): ICD-10-CM

## 2017-07-05 DIAGNOSIS — I87.8 OTHER SPECIFIED DISORDERS OF VEINS: ICD-10-CM

## 2017-07-05 DIAGNOSIS — I83.029 VARICOSE VEINS OF LEFT LOWER EXTREMITY WITH ULCER (H): ICD-10-CM

## 2017-07-05 DIAGNOSIS — Z79.01 LONG TERM CURRENT USE OF ANTICOAGULANT: ICD-10-CM

## 2017-07-05 DIAGNOSIS — I82.403 ACUTE EMBOLISM AND THROMBOSIS OF DEEP VEIN OF BOTH LOWER EXTREMITIES (H): ICD-10-CM

## 2017-07-05 LAB
INR - HISTORICAL: 6.9
INR - HISTORICAL: 7.1
PROTIME - HISTORICAL: 78.6 SEC (ref 11.9–15.2)

## 2017-07-10 ENCOUNTER — ANTICOAGULATION - GICH (OUTPATIENT)
Dept: INTERNAL MEDICINE | Facility: OTHER | Age: 62
End: 2017-07-10

## 2017-07-10 DIAGNOSIS — I82.403 ACUTE EMBOLISM AND THROMBOSIS OF DEEP VEIN OF BOTH LOWER EXTREMITIES (H): ICD-10-CM

## 2017-07-10 DIAGNOSIS — Z79.01 LONG TERM CURRENT USE OF ANTICOAGULANT: ICD-10-CM

## 2017-07-10 LAB — INR - HISTORICAL: 2.5

## 2017-07-24 ENCOUNTER — HOSPITAL ENCOUNTER (OUTPATIENT)
Dept: RADIOLOGY | Facility: OTHER | Age: 62
End: 2017-07-24
Attending: SURGERY

## 2017-07-24 ENCOUNTER — COMMUNICATION - GICH (OUTPATIENT)
Dept: FAMILY MEDICINE | Facility: OTHER | Age: 62
End: 2017-07-24

## 2017-07-24 ENCOUNTER — OFFICE VISIT - GICH (OUTPATIENT)
Dept: FAMILY MEDICINE | Facility: OTHER | Age: 62
End: 2017-07-24

## 2017-07-24 ENCOUNTER — OFFICE VISIT - GICH (OUTPATIENT)
Dept: SURGERY | Facility: OTHER | Age: 62
End: 2017-07-24

## 2017-07-24 ENCOUNTER — HISTORY (OUTPATIENT)
Dept: SURGERY | Facility: OTHER | Age: 62
End: 2017-07-24

## 2017-07-24 DIAGNOSIS — L97.522 NON-PRESSURE CHRONIC ULCER OF OTHER PART OF LEFT FOOT WITH FAT LAYER EXPOSED (H): ICD-10-CM

## 2017-07-24 DIAGNOSIS — S99.912A INJURY OF LEFT ANKLE: ICD-10-CM

## 2017-07-24 DIAGNOSIS — I77.6 ARTERITIS (H): ICD-10-CM

## 2017-07-24 DIAGNOSIS — S99.922A INJURY OF LEFT FOOT: ICD-10-CM

## 2017-07-24 DIAGNOSIS — F32.9 MAJOR DEPRESSIVE DISORDER, SINGLE EPISODE: ICD-10-CM

## 2017-07-24 DIAGNOSIS — S82.842A CLOSED DISPLACED BIMALLEOLAR FRACTURE OF LEFT LOWER LEG: ICD-10-CM

## 2017-07-24 DIAGNOSIS — S82.843A: ICD-10-CM

## 2017-07-24 DIAGNOSIS — F33.41 RECURRENT MAJOR DEPRESSIVE DISORDER IN PARTIAL REMISSION (H): ICD-10-CM

## 2017-07-24 DIAGNOSIS — Z79.01 LONG TERM CURRENT USE OF ANTICOAGULANT: ICD-10-CM

## 2017-07-24 DIAGNOSIS — R00.0 TACHYCARDIA: ICD-10-CM

## 2017-07-24 LAB
INR - HISTORICAL: 3.2
PROTIME - HISTORICAL: 35.2 SEC (ref 11.9–15.2)

## 2017-07-25 ENCOUNTER — COMMUNICATION - GICH (OUTPATIENT)
Dept: FAMILY MEDICINE | Facility: OTHER | Age: 62
End: 2017-07-25

## 2017-08-07 ENCOUNTER — OFFICE VISIT - GICH (OUTPATIENT)
Dept: FAMILY MEDICINE | Facility: OTHER | Age: 62
End: 2017-08-07

## 2017-08-07 ENCOUNTER — HISTORY (OUTPATIENT)
Dept: FAMILY MEDICINE | Facility: OTHER | Age: 62
End: 2017-08-07

## 2017-08-07 ENCOUNTER — COMMUNICATION - GICH (OUTPATIENT)
Dept: FAMILY MEDICINE | Facility: OTHER | Age: 62
End: 2017-08-07

## 2017-08-07 DIAGNOSIS — L97.929 VARICOSE VEINS OF LEFT LOWER EXTREMITY WITH ULCER (H): ICD-10-CM

## 2017-08-07 DIAGNOSIS — I83.029 VARICOSE VEINS OF LEFT LOWER EXTREMITY WITH ULCER (H): ICD-10-CM

## 2017-08-07 DIAGNOSIS — L97.522 NON-PRESSURE CHRONIC ULCER OF OTHER PART OF LEFT FOOT WITH FAT LAYER EXPOSED (H): ICD-10-CM

## 2017-08-07 DIAGNOSIS — Z74.09 OTHER REDUCED MOBILITY: ICD-10-CM

## 2017-08-07 DIAGNOSIS — M21.372 LEFT FOOT DROP: ICD-10-CM

## 2017-08-07 DIAGNOSIS — S82.842S DISPLACED BIMALLEOLAR FRACTURE OF LEFT LOWER LEG, SEQUELA: ICD-10-CM

## 2017-08-07 DIAGNOSIS — G04.89: ICD-10-CM

## 2017-08-07 DIAGNOSIS — M21.371 RIGHT FOOT DROP: ICD-10-CM

## 2017-08-07 DIAGNOSIS — I77.6 ARTERITIS (H): ICD-10-CM

## 2017-08-11 ENCOUNTER — COMMUNICATION - GICH (OUTPATIENT)
Dept: FAMILY MEDICINE | Facility: OTHER | Age: 62
End: 2017-08-11

## 2017-08-18 ENCOUNTER — HISTORY (OUTPATIENT)
Dept: FAMILY MEDICINE | Facility: OTHER | Age: 62
End: 2017-08-18

## 2017-08-18 ENCOUNTER — OFFICE VISIT - GICH (OUTPATIENT)
Dept: FAMILY MEDICINE | Facility: OTHER | Age: 62
End: 2017-08-18

## 2017-08-18 DIAGNOSIS — G89.4 CHRONIC PAIN SYNDROME: ICD-10-CM

## 2017-08-18 DIAGNOSIS — R94.31 ABNORMAL ELECTROCARDIOGRAM: ICD-10-CM

## 2017-08-18 DIAGNOSIS — Z51.81 ENCOUNTER FOR THERAPEUTIC DRUG LEVEL MONITORING: ICD-10-CM

## 2017-08-18 DIAGNOSIS — L97.929 NON-PRESSURE CHRONIC ULCER OF LEFT LOWER LEG (H): ICD-10-CM

## 2017-08-18 DIAGNOSIS — Z01.818 ENCOUNTER FOR OTHER PREPROCEDURAL EXAMINATION: ICD-10-CM

## 2017-08-18 DIAGNOSIS — I77.6 ARTERITIS (H): ICD-10-CM

## 2017-08-18 DIAGNOSIS — S82.842S DISPLACED BIMALLEOLAR FRACTURE OF LEFT LOWER LEG, SEQUELA: ICD-10-CM

## 2017-08-18 DIAGNOSIS — Z79.01 LONG TERM CURRENT USE OF ANTICOAGULANT: ICD-10-CM

## 2017-08-18 DIAGNOSIS — M21.371 RIGHT FOOT DROP: ICD-10-CM

## 2017-08-18 DIAGNOSIS — M21.372 LEFT FOOT DROP: ICD-10-CM

## 2017-08-18 LAB
A/G RATIO - HISTORICAL: 1.1 (ref 1–2)
ABSOLUTE BASOPHILS - HISTORICAL: 0.1 THOU/CU MM
ABSOLUTE EOSINOPHILS - HISTORICAL: 0.2 THOU/CU MM
ABSOLUTE IMMATURE GRANULOCYTES(METAS,MYELOS,PROS) - HISTORICAL: 0 THOU/CU MM
ABSOLUTE LYMPHOCYTES - HISTORICAL: 1.4 THOU/CU MM (ref 0.9–2.9)
ABSOLUTE MONOCYTES - HISTORICAL: 0.5 THOU/CU MM
ABSOLUTE NEUTROPHILS - HISTORICAL: 3 THOU/CU MM (ref 1.7–7)
ALBUMIN SERPL-MCNC: 3.6 G/DL (ref 3.5–5.7)
ALP SERPL-CCNC: 103 IU/L (ref 34–104)
ALT (SGPT) - HISTORICAL: 17 IU/L (ref 7–52)
ANION GAP - HISTORICAL: 4 (ref 5–18)
AST SERPL-CCNC: 19 IU/L (ref 13–39)
BASOPHILS # BLD AUTO: 1.7 %
BILIRUB SERPL-MCNC: 0.2 MG/DL (ref 0.3–1)
BUN SERPL-MCNC: 18 MG/DL (ref 7–25)
BUN/CREAT RATIO - HISTORICAL: 23
CALCIUM SERPL-MCNC: 9.6 MG/DL (ref 8.6–10.3)
CHLORIDE SERPLBLD-SCNC: 102 MMOL/L (ref 98–107)
CO2 SERPL-SCNC: 27 MMOL/L (ref 21–31)
CREAT SERPL-MCNC: 0.8 MG/DL (ref 0.7–1.3)
EOSINOPHIL NFR BLD AUTO: 3.7 %
ERYTHROCYTE [DISTWIDTH] IN BLOOD BY AUTOMATED COUNT: 14.1 % (ref 11.5–15.5)
GFR IF NOT AFRICAN AMERICAN - HISTORICAL: >60 ML/MIN/1.73M2
GLOBULIN - HISTORICAL: 3.4 G/DL (ref 2–3.7)
GLUCOSE SERPL-MCNC: 91 MG/DL (ref 70–105)
HCT VFR BLD AUTO: 40.9 % (ref 33–51)
HEMOGLOBIN: 13.4 G/DL (ref 12–16)
IMMATURE GRANULOCYTES(METAS,MYELOS,PROS) - HISTORICAL: 0.2 %
INR - HISTORICAL: 1.2
LYMPHOCYTES NFR BLD AUTO: 26.8 % (ref 20–44)
MCH RBC QN AUTO: 27.7 PG (ref 26–34)
MCHC RBC AUTO-ENTMCNC: 32.8 G/DL (ref 32–36)
MCV RBC AUTO: 85 FL (ref 80–100)
MONOCYTES NFR BLD AUTO: 9.3 %
NEUTROPHILS NFR BLD AUTO: 58.3 % (ref 42–72)
PLATELET # BLD AUTO: 359 THOU/CU MM (ref 140–440)
PMV BLD: 9.2 FL (ref 6.5–11)
POTASSIUM SERPL-SCNC: 4.5 MMOL/L (ref 3.5–5.1)
PROT SERPL-MCNC: 7 G/DL (ref 6.4–8.9)
PROTIME - HISTORICAL: 13.8 SEC (ref 11.9–15.2)
RED BLOOD COUNT - HISTORICAL: 4.84 MIL/CU MM (ref 4–5.2)
SODIUM SERPL-SCNC: 133 MMOL/L (ref 133–143)
WHITE BLOOD COUNT - HISTORICAL: 5.2 THOU/CU MM (ref 4.5–11)

## 2017-08-18 ASSESSMENT — PATIENT HEALTH QUESTIONNAIRE - PHQ9: SUM OF ALL RESPONSES TO PHQ QUESTIONS 1-9: 9

## 2017-08-20 ENCOUNTER — COMMUNICATION - GICH (OUTPATIENT)
Dept: FAMILY MEDICINE | Facility: OTHER | Age: 62
End: 2017-08-20

## 2017-08-21 ENCOUNTER — COMMUNICATION - GICH (OUTPATIENT)
Dept: FAMILY MEDICINE | Facility: OTHER | Age: 62
End: 2017-08-21

## 2017-08-22 ENCOUNTER — AMBULATORY - GICH (OUTPATIENT)
Dept: SCHEDULING | Facility: OTHER | Age: 62
End: 2017-08-22

## 2017-08-25 ENCOUNTER — AMBULATORY - GICH (OUTPATIENT)
Dept: SCHEDULING | Facility: OTHER | Age: 62
End: 2017-08-25

## 2017-08-30 ENCOUNTER — COMMUNICATION - GICH (OUTPATIENT)
Dept: FAMILY MEDICINE | Facility: OTHER | Age: 62
End: 2017-08-30

## 2017-09-01 ENCOUNTER — AMBULATORY - GICH (OUTPATIENT)
Dept: SCHEDULING | Facility: OTHER | Age: 62
End: 2017-09-01

## 2017-09-06 ENCOUNTER — COMMUNICATION - GICH (OUTPATIENT)
Dept: FAMILY MEDICINE | Facility: OTHER | Age: 62
End: 2017-09-06

## 2017-09-08 ENCOUNTER — ANTICOAGULATION - GICH (OUTPATIENT)
Dept: INTERNAL MEDICINE | Facility: OTHER | Age: 62
End: 2017-09-08

## 2017-09-08 ENCOUNTER — HISTORY (OUTPATIENT)
Dept: FAMILY MEDICINE | Facility: OTHER | Age: 62
End: 2017-09-08

## 2017-09-08 ENCOUNTER — OFFICE VISIT - GICH (OUTPATIENT)
Dept: FAMILY MEDICINE | Facility: OTHER | Age: 62
End: 2017-09-08

## 2017-09-08 DIAGNOSIS — K52.9 NONINFECTIVE GASTROENTERITIS AND COLITIS: ICD-10-CM

## 2017-09-08 DIAGNOSIS — F41.9 ANXIETY DISORDER: ICD-10-CM

## 2017-09-08 DIAGNOSIS — F32.9 MAJOR DEPRESSIVE DISORDER, SINGLE EPISODE: ICD-10-CM

## 2017-09-08 DIAGNOSIS — Z79.01 LONG TERM CURRENT USE OF ANTICOAGULANT: ICD-10-CM

## 2017-09-08 DIAGNOSIS — I82.403 ACUTE EMBOLISM AND THROMBOSIS OF DEEP VEIN OF BOTH LOWER EXTREMITIES (H): ICD-10-CM

## 2017-09-08 DIAGNOSIS — S82.843A: ICD-10-CM

## 2017-09-08 DIAGNOSIS — I77.6 ARTERITIS (H): ICD-10-CM

## 2017-09-08 LAB — INR - HISTORICAL: 2.9

## 2017-09-18 ENCOUNTER — OFFICE VISIT - GICH (OUTPATIENT)
Dept: SURGERY | Facility: OTHER | Age: 62
End: 2017-09-18

## 2017-09-18 ENCOUNTER — HISTORY (OUTPATIENT)
Dept: SURGERY | Facility: OTHER | Age: 62
End: 2017-09-18

## 2017-09-18 ENCOUNTER — HISTORY (OUTPATIENT)
Dept: FAMILY MEDICINE | Facility: OTHER | Age: 62
End: 2017-09-18

## 2017-09-18 ENCOUNTER — ANTICOAGULATION - GICH (OUTPATIENT)
Dept: INTERNAL MEDICINE | Facility: OTHER | Age: 62
End: 2017-09-18

## 2017-09-18 ENCOUNTER — OFFICE VISIT - GICH (OUTPATIENT)
Dept: FAMILY MEDICINE | Facility: OTHER | Age: 62
End: 2017-09-18

## 2017-09-18 DIAGNOSIS — I47.9 PAROXYSMAL TACHYCARDIA (H): ICD-10-CM

## 2017-09-18 DIAGNOSIS — Z02.89 ENCOUNTER FOR OTHER ADMINISTRATIVE EXAMINATIONS: ICD-10-CM

## 2017-09-18 DIAGNOSIS — S82.842A CLOSED DISPLACED BIMALLEOLAR FRACTURE OF LEFT LOWER LEG: ICD-10-CM

## 2017-09-18 DIAGNOSIS — F41.9 ANXIETY DISORDER: ICD-10-CM

## 2017-09-18 DIAGNOSIS — Z79.01 LONG TERM CURRENT USE OF ANTICOAGULANT: ICD-10-CM

## 2017-09-18 DIAGNOSIS — I82.403 ACUTE EMBOLISM AND THROMBOSIS OF DEEP VEIN OF BOTH LOWER EXTREMITIES (H): ICD-10-CM

## 2017-09-18 DIAGNOSIS — G89.4 CHRONIC PAIN SYNDROME: ICD-10-CM

## 2017-09-18 DIAGNOSIS — F32.9 MAJOR DEPRESSIVE DISORDER, SINGLE EPISODE: ICD-10-CM

## 2017-09-18 LAB
INR - HISTORICAL: 2.7
MAGNESIUM SERPL-MCNC: 1.7 MG/DL (ref 1.9–2.7)

## 2017-09-19 ENCOUNTER — HISTORY (OUTPATIENT)
Dept: FAMILY MEDICINE | Facility: OTHER | Age: 62
End: 2017-09-19

## 2017-10-08 ENCOUNTER — COMMUNICATION - GICH (OUTPATIENT)
Dept: FAMILY MEDICINE | Facility: OTHER | Age: 62
End: 2017-10-08

## 2017-10-08 DIAGNOSIS — F33.41 RECURRENT MAJOR DEPRESSIVE DISORDER IN PARTIAL REMISSION (H): ICD-10-CM

## 2017-10-18 ENCOUNTER — COMMUNICATION - GICH (OUTPATIENT)
Dept: FAMILY MEDICINE | Facility: OTHER | Age: 62
End: 2017-10-18

## 2017-10-20 ENCOUNTER — COMMUNICATION - GICH (OUTPATIENT)
Dept: FAMILY MEDICINE | Facility: OTHER | Age: 62
End: 2017-10-20

## 2017-11-24 ENCOUNTER — AMBULATORY - GICH (OUTPATIENT)
Dept: SCHEDULING | Facility: OTHER | Age: 62
End: 2017-11-24

## 2017-11-27 ENCOUNTER — COMMUNICATION - GICH (OUTPATIENT)
Dept: FAMILY MEDICINE | Facility: OTHER | Age: 62
End: 2017-11-27

## 2017-12-11 ENCOUNTER — COMMUNICATION - GICH (OUTPATIENT)
Dept: FAMILY MEDICINE | Facility: OTHER | Age: 62
End: 2017-12-11

## 2017-12-11 DIAGNOSIS — F41.9 ANXIETY DISORDER: ICD-10-CM

## 2017-12-17 ENCOUNTER — HEALTH MAINTENANCE LETTER (OUTPATIENT)
Age: 62
End: 2017-12-17

## 2017-12-27 NOTE — PROGRESS NOTES
Patient Information     Patient Name MRN Sandie Fuentes [id number] Female 1955      Progress Notes by David Cottrell MD at 2017 11:15 AM     Author:  David Cottrell MD Service:  (none) Author Type:  Physician     Filed:  2017  1:32 PM Encounter Date:  2017 Status:  Signed     :  David Cottrell MD (Physician)            SUBJECTIVE:  62 y.o. female who presents for follow-up of her surgery. She is now back at home. She's doing her wound care herself daily. She has a splint on the left lower leg which is removable so that she can get at the chronic lesions. She also has an open area on her left foot on the lateral aspect of the plantar surface. She's developed new open areas on the dorsum of the foot. She's not having any increased pain. She is presently using hydromorphone for her pain which is been controlling it well. She states she has enough pain medication to last for at least another 10 days. She needs a few medications refilled. She has been using Flexeril for spasm which is been quite helpful.    Additional Review of Systems: see HPI:   No new symptoms otherwise. Right foot is unchanged.    Past Medical History:     Diagnosis  Date     Anxiety disorder      Bilateral foot-drop     following rhabdomyolysis      C. difficile colitis 3/7/2015     C. difficile colitis 2015     Chronic diarrhea 2015    Dr Woods, [facility]      Chronic pain      DVT (deep venous thrombosis) (HC) 2011    bilateral; chronic anticoag      Fingers fractured 07    fractured right 5th finger      GERD (gastroesophageal reflux disease)      History of rhabdomyolysis 2015, 3/2016    Hosp X2 at [facility] in [place]      Hx of ectopic pregnancy     x2      Leg ulcer, left (HC) 2010    began 2010; venous stasis ulcer; Dr Hayden      Lower extremity venous stasis     followed By Dr Hayden      Major depressive episode      Mesenteric thrombosis (HC)      Lifelong warfarin recommended by gastroenterology      Mood disorder (HC)      Paroxysmal tachycardia (HC)      QT prolongation 4/17/2017     Traumatic rhabdomyolysis (HC) 04/17/2017    hosp'd 7 days at [facility]      Vasculitis (HC) 1/2014    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 Max acetaminophen dose: 4000mg in 24 hrs.       ALPRAZolam (XANAX) 1 mg tablet TAKE ONE TABLET BY MOUTH THREE TIMES DAILY AS NEEDED FOR ANXIETY 90 tablet 2     cyclobenzaprine (FLEXERIL) 10 mg tablet Take 1 tablet by mouth 3 times daily if needed for Muscle Spasm. 30 tablet 3     desipramine 100 mg tablet TAKE 1 TABLET BY MOUTH EVERY DAY 30 tablet 0     dextroamphetamine (DEXEDRINE) 5 mg tablet Take 2 tablets by mouth three times daily at 8 AM, noon, and 4 PM. 180 tablet 0     diphenoxylate-atropine, 2.5-0.025 mg, (LOMOTIL) 2.5-0.025 mg tablet Take 1 tablet by mouth 4 times daily if needed for Diarrhea. 90 tablet 5     gabapentin (NEURONTIN) 300 mg capsule Take 3 capsules by mouth 3 times daily. 270 capsule 11     hydrOXYzine pamoate (VISTARIL) 25 mg capsule Take 1-2 capsules by mouth every 6 hours if needed. 100 capsule 3     L.ACID/L.CASEI/B.BIF/B.YUE/FOS (PROBIOTIC BLEND ORAL) Take 1 capsule by mouth once daily.       loperamide (IMODIUM) 2 mg capsule Take 1 capsule by mouth 4 times daily if needed for Diarrhea. 100 capsule 1     magnesium oxide (MAG-) 400 mg tablet Take 1 tablet by mouth 2 times daily. 180 tablet 4     nadolol (CORGARD) 20 mg tablet TAKE 1 TAB BY MOUTH AT BEDTIME  99     nortriptyline 50 mg capsule Take 2 capsules by mouth at bedtime. 180 capsule 3     oxyCODONE (ROXICODONE) 5 mg immediate release tablet Take 1 tablet by mouth every 4 hours if needed  for Pain 50 tablet 0      "oxyCODONE 10 mg tablet 2 tablets tid and one tablet at hs 210 tablet 0     pantoprazole (PROTONIX) 40 mg delayed-release tablet Take 1 tablet by mouth once daily. 90 tablet 3     PROPYLENE GLYCOL OPHT Place 1-2 Drops into both ears 3 times daily if needed.       warfarin (COUMADIN) 2.5 mg tablet Take as directed by [facility] 100 tablet 4     Wheel Chair Wheelchair: lt wt  with leg rests: (Swing away and tilting-- Length of need: 99 months.  PT to evaluate 1 Device 0     No current facility-administered medications for this visit.      Medications have been reviewed by me and are current to the best of my knowledge and ability.      Allergies as of 09/08/2017 - Mack as Reviewed 09/08/2017      Allergen  Reaction Noted     Erythromycin Rash 12/13/2012     Sulfa (sulfonamide antibiotics) Rash 12/13/2012        OBJECTIVE:  /60  Pulse 80  Ht 1.676 m (5' 6\")  Wt 64.4 kg (142 lb)  BMI 22.92 kg/m2  EXAM:  General Appearance: Pleasant, alert, appropriate appearance for age. No acute distress  Foot Exam: The splint and dressings were removed from the left foot. The surgical incisions are nicely healed. There are superficial open areas over the dorsum of the foot on the lateral aspect. These are clean and not inflamed or infected. The lesion on the plantar surface is also clean and noninfected, about 1.5 cm in diameter, and relatively deep. Capillary filling is good and sensation is slightly decreased.      ASSESSMENT/PLAN:  History of bimalleolar ankle fracture-seems to be doing well postoperatively. She has follow-up with orthopedics next week. With the open wound on the foot she plans to see Dr. Hayden to have this reevaluated. We will see her back in follow-up again in 10 days and review all her meds and then 2 refills at that time. She is subsequently planning to move to [place] for the winter. Other medications were refilled.  David Cottrell MD ....................  9/8/2017   1:32 PM            "

## 2017-12-27 NOTE — PROGRESS NOTES
Patient Information     Patient Name MRN Sex Sandie Kearns [id number] Female 1955      Progress Notes by Elena Watson RN at 6/3/2017 10:55 AM     Author:  Elena Watson RN Service:  (none) Author Type:  NURS- Registered Nurse     Filed:  6/3/2017 11:16 AM Date of Service:  6/3/2017 10:55 AM Status:  Signed     :  Elena Watson RN (NURS- Registered Nurse)            Pt insisted and had already done dressng change to great toe. Blister on left knee rewrapped.

## 2017-12-27 NOTE — PROGRESS NOTES
Patient Information     Patient Name MRN Sex Sandie Kearns [id number] Female 1955      Progress Notes by Gabi Benjamin NP at 2017 10:20 AM     Author:  Gabi Benjamin NP Service:  (none) Author Type:  PHYS- Nurse Practitioner     Filed:  2017 11:25 AM Encounter Date:  2017 Status:  Signed     :  Gabi Benjamin NP (PHYS- Nurse Practitioner)            SUBJECTIVE:    Sandie Stuart is a 62 y.o. female who presents for left lower extremity venous stasis ulcer    HPI  she has chronic venous stasis ulcer of the left lower extremity. She has been seeing Dr. Hayden once weekly for management. He has been up applying collagen dressing and Unna boot. She states this is healing. There is been mild serosanguineous drainage to the bandage. Afebrile denies pain. On warfarin  Allergies     Allergen  Reactions     Erythromycin Rash     Sulfa (Sulfonamide Antibiotics) Rash   ,   Current Outpatient Prescriptions on File Prior to Visit       Medication  Sig Dispense Refill     acetaminophen (TYLENOL EXTRA STRGTH) 500 mg tablet Take 1,000 mg by mouth every 8 hours. Max acetaminophen dose: 4000mg in 24 hrs.       ALPRAZolam (XANAX) 1 mg tablet Take 1 tablet by mouth 3 times daily if needed for Anxiety. 54 tablet 0     desipramine 100 mg tablet Take 1 tablet by mouth at bedtime.       dextroamphetamine (DEXEDRINE) 5 mg tablet Take 2 tablets by mouth three times daily at 8 AM, noon, and 4 PM. 54 tablet 0     diphenoxylate-atropine, 2.5-0.025 mg, (LOMOTIL) 2.5-0.025 mg tablet Take 1 tablet by mouth 4 times daily if needed for Diarrhea. 90 tablet 5     gabapentin (NEURONTIN) 300 mg capsule Take 3 capsules by mouth 3 times daily. 270 capsule 11     hydrOXYzine pamoate (VISTARIL) 25 mg capsule Take 1-2 capsules by mouth every 6 hours if needed. 100 capsule 3     L.ACID/L.CASEI/B.BIF/B.YUE/FOS (PROBIOTIC BLEND ORAL) Take 1 capsule by mouth once daily.       loperamide (IMODIUM) 2  mg capsule Take by mouth as needed for diarrhea. Take 4mg by mouth with 1st loose stool, then 2mg with each subsequent loose stool. Max 16 mg in 24 hrs.       magnesium oxide (MAG-) 400 mg tablet Take 1 tablet by mouth 2 times daily. 180 tablet 4     multivitamin (MVI) tablet Take 1 tablet by mouth once daily. 60 tablet 0     nadolol (CORGARD) 40 mg tablet Take 0.5 tablets by mouth at bedtime.  0     nortriptyline 50 mg capsule Take 2 capsules by mouth at bedtime. 180 capsule 3     oxyCODONE 10 mg tablet 2 tablets tid and one tablet at hs 90 tablet 0     pantoprazole (PROTONIX) 40 mg delayed-release tablet Take 1 tablet by mouth once daily. 90 tablet 3     PROPYLENE GLYCOL OPHT Place 1-2 Drops into both ears 3 times daily if needed.       warfarin (COUMADIN) 2.5 mg tablet Take 2.5 mg x six days/week and 1.25 mg x one day/week. Or as directed by [facility] 100 tablet 4     No current facility-administered medications on file prior to visit.     and   Past Medical History:     Diagnosis  Date     Anxiety disorder      Bilateral foot-drop 2016    following rhabdomyolysis      C. difficile colitis 3/7/2015     C. difficile colitis 5/27/2015     Chronic diarrhea 9/17/2015    Dr Woods, [facility]      Chronic pain      DVT (deep venous thrombosis) (HC) 12/2011    bilateral; chronic anticoag      Fingers fractured 07/19/07    fractured right 5th finger      GERD (gastroesophageal reflux disease)      History of rhabdomyolysis 12/2015, 3/2016    Hosp X2 at [facility] in [place]      Hx of ectopic pregnancy     x2      Leg ulcer, left (HC) 06/2010    began 6/2010; venous stasis ulcer; Dr Hayden      Lower extremity venous stasis     followed By Dr Hayden      Major depressive episode      Mesenteric thrombosis (HC) 2015    Lifelong warfarin recommended by gastroenterology      Mood disorder (HC)      Paroxysmal tachycardia (HC)      QT prolongation 4/17/2017     Traumatic rhabdomyolysis (HC) 04/17/2017     "hosp'd 7 days at [facility]      Vasculitis () 1/2014    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 No    EXAM:   Physical Exam  pleasant female without acute distress. Affect normal. Alert and oriented ×4. Unna boot removed from the left leg. There is trace edema. Left leg ulcer present. No surrounding erythema nor warmth. No maceration. Small serosanguineous drainage. Left foot DP PT intact. Capillary refill less than 3 seconds. Wound is cleansed with saline wash, collagen applied and moistened. Unna boot ×1, Kerlix ×1, Coban ×1 applied    ASSESSMENT/PLAN:    ICD-10-CM    1. Leg ulcer, left, with fat layer exposed () L97.922 SC APPLY UNNA BOOT        Plan:  Follow-up with Dr. Hayden in 1 week, return to clinic sooner with any problems        "

## 2017-12-27 NOTE — PROGRESS NOTES
Patient Information     Patient Name MRN Sandie Fuentes [id number] Female 1955      Progress Notes by Mariam Burgos RN at 2017 11:30 PM     Author:  Mariam Burgos RN Service:  (none) Author Type:  NURS- Registered Nurse     Filed:  2017 11:49 PM Date of Service:  2017 11:30 PM Status:  Signed     :  Mariam Burgso RN (NURS- Registered Nurse)            Admission Note    Data:   pt admitted to [facility] from emergency department via bed.     Action:  Family and Dr. Lawson have been notified of admission.      Response:  Patient tolerated transfer.

## 2017-12-27 NOTE — PROGRESS NOTES
Patient Information     Patient Name MRN Sex Sandie Kearns [id number] Female 1955      Progress Notes by Telly Hayden MD at 2017  2:40 PM     Author:  Telly Hayden MD Service:  (none) Author Type:  Physician     Filed:  2017  1:42 PM Encounter Date:  2017 Status:  Signed     :  Telly Hayden MD (Physician)            Subjective:  This is a 62 y.o. female patient with venous stasis ulcer on the left lower extremity here for unna boot change.    Objective: /80  Pulse 100  Resp 20  Breastfeeding? No    The Unnaboot was removed and the leg cleansed with Hibiclens. The ulcer which had been healed, has recurred. It now measures 2.7 x 3 cm. Promogran Ana placed.  The Unna boot was replaced.    Assessment:   Venous stasis ulcer left lower extremity      Plan:  Follow up in one week for Unna boot change.      Telly Hayden MD FACS

## 2017-12-27 NOTE — PROGRESS NOTES
Patient Information     Patient Name MRN Sandie Fuentes [id number] Female 1955      Progress Notes by Rhea Giron at 2017  1:52 PM     Author:  Rhea Giron Service:  (none) Author Type:  Other Clinical Staff     Filed:  2017  1:53 PM Date of Service:  2017  1:52 PM Status:  Signed     :  Rhea Giron (Other Clinical Staff)            Verbal and written MOON documentation given. Rhea Giron ....................  2017   1:53 PM

## 2017-12-27 NOTE — PROGRESS NOTES
Patient Information     Patient Name MRN Sex Sandie Kearns [id number] Female 1955      Progress Notes by Telly Hayden MD at 2017  3:40 PM     Author:  Telly Hayden MD Service:  (none) Author Type:  Physician     Filed:  2017  1:37 PM Encounter Date:  2017 Status:  Signed     :  Telly Hayden MD (Physician)            Patient only seen by nurse. Still has cast on from Orthopedic surgeon. Instructed to go back to Dr Clements ( sp?) in [place]

## 2017-12-27 NOTE — PROGRESS NOTES
Patient Information     Patient Name MRN Sandie Fuentes [id number] Female 1955      Progress Notes by Lorri Ojeda OT at 6/3/2017 12:28 PM     Author:  Lorri Ojeda OT Service:  (none) Author Type:  OT- Occupational Therapist     Filed:  6/3/2017 12:33 PM Date of Service:  6/3/2017 12:28 PM Status:  Signed     :  Lorri Ojeda OT (OT- Occupational Therapist)            [facility]  Occupational Therapy Acute Care Progress Note    S: Patient reports feeling anxious as she feels she does not have much time until discharge. States she slept in this AM.    O: Pt. EOB upon arrival needing to use the bathroom. Stand pivot transfer bed to commode with min A of two for safety. Set up assistance for sponge bath and toileting cares. Patient able to manage LB dress safely from commode. Patient finishing upper body cares with nursing.    A: Patient limited by pain and anxiety. Improving ability to engage in BADLs. Patient would benefit from ongoing therapy for strengthening and safety post acute stay.    P: Patient discharged from hospital this date. OT discharged with hospital discharge.      G codes and Modifier based on patient's presentation and clinical judgement:     Goal Primary G Code and Modifier:    The Patient's G Code Goal would be: Self Care    The Patient's Impairment, Limitation or Restriction Modifier goal would be best described as: CI - 1% - 20% Impairment.      Discharge Primary G Code and Modifier:      The Patient's status upon Discharge is Self Care    The Patient's Impairment, Limitation or Restriction Modifier would be best described as CJ - 20% - 40% Impairment.         THI Beltran/MICHELINE

## 2017-12-27 NOTE — PROGRESS NOTES
"Patient Information     Patient Name MRN Sandie Fuentes [id number] Female 1955      Progress Notes by Telly Hayden MD at 2017  2:40 PM     Author:  Telly Hayden MD Service:  (none) Author Type:  Physician     Filed:  2017  1:50 PM Encounter Date:  2017 Status:  Signed     :  Telly Hayden MD (Physician)            Subjective:  This is a 62 y.o. female patient with venous stasis ulcer on the left lower extremity here for unna boot change.    Objective: /86  Pulse (!) 112  Ht 1.676 m (5' 6\")    The Unnaboot was removed and the leg cleansed with Hibiclens. The ulcer is 2 x 2.2 cm. Promogran Ana placed.  The Unna boot was replaced.    Assessment:   Venous stasis ulcer LLE      Plan:  Follow up in one week for Unna boot change.      Telly Hayden MD FACS            "

## 2017-12-27 NOTE — PROGRESS NOTES
"Patient Information     Patient Name MRN Sandie Fuentes [id number] Female 1955      Progress Notes by Telly Hayden MD at 2017  1:50 PM     Author:  Telly Hayden MD Service:  (none) Author Type:  Physician     Filed:  2017  3:04 PM Encounter Date:  2017 Status:  Signed     :  Telly Hayden MD (Physician)            Subjective:  This is a follow up of a venous stasis ulcer on left shin. The patient has new complaints of trauma to left foot. This was run over by her walker and is painful and swollen and she cannot bear weight.    Objective: /82  Pulse 60  Ht 1.676 m (5' 6\")  The ulcer remains well healed without erythema. The left foot is markedly edematous and tender and deformed. New ulcer over 5th metatarsal head to fat layer, about 1 cm in size.    Assessment:    Venous stasis ulcer remains healed  Bi-malleolar fracture  Left foot ulcer, decubitus    Plan:  Consult Ortho Dr Woods  Normal saline wet-to-dry dressing twice daily and follow up in three weeks to left foot ulcer          "

## 2017-12-27 NOTE — PROGRESS NOTES
Patient Information     Patient Name MRN Sex Sandie Kearns [id number] Female 1955      Progress Notes by Elena Watson RN at 6/3/2017 11:16 AM     Author:  Elena Watson RN Service:  (none) Author Type:  NURS- Registered Nurse     Filed:  6/3/2017 11:19 AM Date of Service:  6/3/2017 11:16 AM Status:  Signed     :  Elena Watson RN (NURS- Registered Nurse)            Discharge Note    Data:  Sandie Stuart has been discharged to a nursing home, [facility] at [address] via wheelchair accompanied by Nursing Assistant and Transport Aide.      Action:  Written discharge/follow-up instructions were provided to nursing home. Prescriptions were sent to patients pharmacy.  Belongings sent with patient. Medications from home sent with patient/family: Not Applicable  Equipment none .     Response:  GV nurse April was given nurse to nurse report and verbalized understanding of discharge instructions, reason for discharge, and necessary follow-up appointments.

## 2017-12-27 NOTE — PROGRESS NOTES
"Patient Information     Patient Name MRN Sandie Fuentes [id number] Female 1955      Progress Notes by Renetta Camejo PharmD at 2017 12:58 PM     Author:  Renetta Camejo PharmD  Service:  (none) Author Type:  PHARM- Pharmacist     Filed:  2017  2:18 PM  Date of Service:  2017 12:58 PM Status:  Addendum     :  Renetta Camejo PharmD (PHARM- Pharmacist)        Related Notes: Original Note by Gladys Moffett PharmD (PHARM- Pharmacist) filed at 2017  1:14 PM            Pharmacy -- Admission Medication Reconciliation    Prior to admission (PTA) medications were reviewed and the patient's PTA medication list was updated.     Sources Consulted: Patient, Caro Ribera    The reliability of this Medication Reconciliation is: MODERATE.    The following significant changes were made:  1. Adjusted acetaminophen to Q8H vs TID  2. Adjusted timing for cholecalciferol, desipramine, and nadolol to HS  3. Adjusted PRN eye drops to reflect [employer] active ingredient and reflect patient's directions    - Patient very concerned regarding her Lomotil and Imodium dosing. She wishes that the doses could be increased, she was unable to describe specifically how she takes these at home, this is very dependent on how she feels. She states that she does not like to wait until she actually has diarrhea to take a dose. Medication list was not updated.     - Patient very concerned regarding her alprazolam dosing - she would like the order to specify a time range between each dose.     - Patient very concerned regarding her acetaminophen dosing, she prefers this to be spread out throughout the day as close to Q8H dosing as able.     - Patient does not like Questran and states that she has held this for \"a long time\". This was not removed from medication list.     - Patient states that she does not take hydrochlorothiazide as she is worried that this will drop her blood pressure too far. This " was not removed from her medication list.     ** Patient was only able to recall her warfarin dose over the last few days - as far back as she could remember, which does match directions from the [facility].   - 5/27: 2.5 mg  - 5/28: 2.5 mg  - 5/29: 2.5 mg  - 5/30: 2.5 mg  - 5/31: HELD    In addition, the patient's allergies were reviewed with the patient and updated as follows -  Allergies     Allergen  Reactions     Erythromycin Rash     Sulfa (Sulfonamide Antibiotics) Rash       The pharmacist has reviewed with the patient that all personal medications should be removed from the building or locked in the belongings safe. Patient shall only take medications ordered by the physician and administered by the nursing staff.     Pharmacy Discharge Planning      Medication barriers identified:  Patient very concerned regarding specific dosing of her medications.    Medication adherence concerns:  Patient recently discharged from [facility] around one week ago. Difficult to assess compliance with refill history. Patient was able to discuss all medications.    Understanding of emergency medications:  N/A    Gladys Moffett PharmD ....................  6/2/2017   12:58 PM    Addendum:    Cholestyramine REMOVED from med list after discussion with MD and patient.  Mary ElderD ....................  6/2/2017   2:18 PM

## 2017-12-27 NOTE — PROGRESS NOTES
"Patient Information     Patient Name MRN Sandie Fuentes [id number] Female 1955      Progress Notes by Telly Hayden MD at 2017  1:30 PM     Author:  Telly Hayden MD Service:  (none) Author Type:  Physician     Filed:  2017  1:54 PM Encounter Date:  2017 Status:  Signed     :  Telly Hayden MD (Physician)            Subjective:  This is a 62 y.o. female patient with venous stasis ulcer on the left lower extremity here for unna boot change.    Objective: /66  Pulse (!) 104  Ht 1.676 m (5' 6\")    The Unnaboot was removed and the leg cleansed with Hibiclens. The ulcer is completely epithelialized.  The Unna boot was replaced.    Assessment:   Venous stasis ulcer/disease LLE      Plan:  Follow up in one-two weeks for Unna boot change.      Telly Hayden MD FACS            "

## 2017-12-27 NOTE — PROGRESS NOTES
Patient Information     Patient Name MRN Sex Sandie Kearns [id number] Female 1955      Progress Notes by Tori Puckett RPh at 6/3/2017 11:04 AM     Author:  Tori Puckett RPh Service:  (none) Author Type:  PHARM- Pharmacist     Filed:  6/3/2017 11:04 AM Date of Service:  6/3/2017 11:04 AM Status:  Signed     :  Tori Puckett RPh (PHARM- Pharmacist)              [address] [place], [place] [address] [place] [address]    6/3/2017    Dear Pharmacist,    Your customer, Sandie Stuart, born on 1955, was recently discharged from [facility]. We have updated her medication list and want to alert you to the following:       Your Home Medicines      CHANGE how you take these medicines       Instructions    desipramine 100 mg tablet   For diagnoses:  Major depressive episode   What changed:  See the new instructions.    Take 1 tablet by mouth at bedtime.   Doctor's comments:  **Patient requests 90 days supply**       diphenoxylate-atropine (2.5-0.025 mg) 2.5-0.025 mg tablet   For diagnoses:  Dyspeptic diarrhea, Chronic diarrhea   What changed:  See the new instructions.   Commonly known as:  LOMOTIL    Take 1 tablet by mouth 4 times daily if needed for Diarrhea.       nadolol 40 mg tablet   For diagnoses:  Tachycardia, paroxysmal (HC)   What changed:  how much to take   Commonly known as:  CORGARD    Take 0.5 tablets by mouth at bedtime.       warfarin 2.5 mg tablet   For diagnoses:  Deep vein thrombosis (DVT) of proximal vein of both lower extremities, unspecified chronicity (HC), Anticoagulation monitoring, special range   What changed:    - how much to take  - how to take this  - when to take this  - additional instructions   Commonly known as:  COUMADIN    Take 0.5 tablets by mouth once daily. To be adjusted by [facility]         CONTINUE taking these medicines       Instructions    acetaminophen 500 mg tablet   Commonly known as:  TYLENOL EXTRA STRGTH    Take 1,000 mg by  mouth every 8 hours. Max acetaminophen dose: 4000mg in 24 hrs.       ALPRAZolam 1 mg tablet   For diagnoses:  Anxiety   Commonly known as:  XANAX    Take 1 tablet by mouth 3 times daily if needed for Anxiety.       dextroamphetamine 5 mg tablet   For diagnoses:  Major depressive episode   Commonly known as:  DEXEDRINE    Take 2 tablets by mouth three times daily at 8 AM, noon, and 4 PM.       gabapentin 300 mg capsule   For diagnoses:  Bilateral foot-drop, Vasculitis (HC)   Commonly known as:  NEURONTIN    Take 3 capsules by mouth 3 times daily.       hydrOXYzine pamoate 25 mg capsule   For diagnoses:  Vasculitis (HC)   Commonly known as:  VISTARIL    Take 1-2 capsules by mouth every 6 hours if needed.       loperamide 2 mg capsule   Commonly known as:  IMODIUM    Take by mouth as needed for diarrhea. Take 4mg by mouth with 1st loose stool, then 2mg with each subsequent loose stool. Max 16 mg in 24 hrs.       magnesium oxide 400 mg tablet   For diagnoses:  Hypotension, unspecified hypotension type   Commonly known as:  MAG-    Take 1 tablet by mouth 2 times daily.       multivitamin tablet   Commonly known as:  MVI    Take 1 tablet by mouth once daily.       nortriptyline 50 mg capsule   For diagnoses:  Major depressive episode    Take 2 capsules by mouth at bedtime.       oxyCODONE 10 mg tablet   For diagnoses:  Vasculitis (HC)    2 tablets tid and one tablet at hs       pantoprazole 40 mg delayed-release tablet   For diagnoses:  Gastroesophageal reflux disease without esophagitis   Commonly known as:  PROTONIX    Take 1 tablet by mouth once daily.       PROBIOTIC BLEND ORAL    Take 1 capsule by mouth once daily.       PROPYLENE GLYCOL OPHT    Place 1-2 Drops into both ears 3 times daily if needed.         STOP taking these medicines          cholecalciferol 1,000 unit capsule   Commonly known as:  Vitamin D       hydroCHLOROthiazide 25 mg tablet   Commonly known as:  HCTZ            Where to get your  medicines      You have received printed prescription(s) for these medicines or supplies. Take these to your preferred pharmacy.     Bring a paper prescription for each of these medications      ALPRAZolam 1 mg tablet     dextroamphetamine 5 mg tablet     diphenoxylate-atropine (2.5-0.025 mg) 2.5-0.025 mg tablet     oxyCODONE 10 mg tablet         Prescriptions for these medicines or supplies were NOT printed nor sent to your preferred pharmacy. Check with your doctor if you have questions.     ! Check with your doctor if you have questions.      desipramine 100 mg tablet     nadolol 40 mg tablet     warfarin 2.5 mg tablet             We also reviewed Sandie Stuart s allergy list and updated it as needed:  Allergies     Allergen  Reactions     Erythromycin Rash     Sulfa (Sulfonamide Antibiotics) Rash       Thank you for continuing to care for Sandie Stuart. We look forward to working together with you in the future.    Sincerely,  Tori Puckett [facility] ....................  6/3/2017   11:04 AM  [facility]

## 2017-12-27 NOTE — PROGRESS NOTES
Patient Information     Patient Name MRN Sandie Fuentes [id number] Female 1955      Progress Notes by David Cottrell MD at 2017 11:00 AM     Author:  David Cottrell MD Service:  (none) Author Type:  Physician     Filed:  2017  1:23 PM Encounter Date:  2017 Status:  Signed     :  David Cottrell MD (Physician)            SUBJECTIVE:  62 y.o. female who presents for medication refill. She is moving to [place] later this week, but intends to come back in December for a visit. She is requesting 3 months refills of her pain medication and Dexedrine.    Her pain medication was changed to hydromorphone by Dr. Peng in [place], when she was there for rehabilitation. She is taking seven 2 mg tablets daily and these are working quite well. This is 14 mg daily which is 56 morphine equivalents, considerably less than the oxycodone she was taking previously. She is using these rather than the oxycodone. She is no longer taking the latter.    She also takes Xanax 3 times daily. She is well aware of the risks of Xanax with narcotics.    She will see Dr. Hayden later today for evaluation of her left leg. She said it feels fairly good.    She had questions about whether or not she needed to take magnesium. She is taking this twice daily but sometimes forgets. All her other medications remain the same.    Additional Review of Systems: see HPI:   She is getting along reasonably well. No new symptoms otherwise.    Past Medical History:     Diagnosis  Date     Anxiety disorder      Bilateral foot-drop 2016    following rhabdomyolysis      C. difficile colitis 3/7/2015     C. difficile colitis 2015     Chronic diarrhea 2015    Dr Woods, [facility]      Chronic pain      DVT (deep venous thrombosis) (HC) 2011    bilateral; chronic anticoag      Fingers fractured 07    fractured right 5th finger      GERD (gastroesophageal reflux disease)      History of  rhabdomyolysis 12/2015, 3/2016    Hosp X2 at [facility] in [place]      Hx of ectopic pregnancy     x2      Leg ulcer, left (HC) 06/2010    began 6/2010; venous stasis ulcer; Dr Hayden      Lower extremity venous stasis     followed By Dr Hayden      Major depressive episode      Mesenteric thrombosis (HC) 2015    Lifelong warfarin recommended by gastroenterology      Mood disorder (HC)      Paroxysmal tachycardia (HC)      QT prolongation 4/17/2017     Traumatic rhabdomyolysis (HC) 04/17/2017    hosp'd 7 days at [facility]      Vasculitis (HC) 1/2014    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 Max acetaminophen dose: 4000mg in 24 hrs.       ALPRAZolam (XANAX) 1 mg tablet TAKE ONE TABLET BY MOUTH THREE TIMES DAILY AS NEEDED FOR ANXIETY 90 tablet 5     cyclobenzaprine (FLEXERIL) 10 mg tablet Take 1 tablet by mouth 3 times daily if needed for Muscle Spasm. 30 tablet 3     desipramine 100 mg tablet TAKE 1 TABLET BY MOUTH EVERY DAY 30 tablet 0     dextroamphetamine (DEXEDRINE) 5 mg tablet Take 2 tablets by mouth at 8 AM and noon, and one at 4 PM. 150 tablet 0     dextroamphetamine (DEXEDRINE) 5 mg tablet Take 2 tablets by mouth at 8 AM and noon, and one at 4 PM. 150 tablet 0     diphenoxylate-atropine, 2.5-0.025 mg, (LOMOTIL) 2.5-0.025 mg tablet Take 1 tablet by mouth 4 times daily if needed for Diarrhea. 90 tablet 5     gabapentin (NEURONTIN) 300 mg capsule Take 3 capsules by mouth 3 times daily. 270 capsule 11     HYDROmorphone (DILAUDID) 2 mg tablet TAKE 1-2 TABLETS PO Q THREE HOURS AS NEEDED FOR PAIN-max. 8 tablets/24 hours 240 tablet 0     HYDROmorphone (DILAUDID) 2 mg tablet TAKE 1-2 TABLETS PO Q THREE HOURS AS NEEDED FOR PAIN-max. 8 tablets/24 hours 240 tablet 0     hydrOXYzine pamoate  "(VISTARIL) 25 mg capsule Take 1-2 capsules by mouth every 6 hours if needed. 100 capsule 3     L.ACID/L.CASEI/B.BIF/B.YUE/FOS (PROBIOTIC BLEND ORAL) Take 1 capsule by mouth once daily.       loperamide (IMODIUM) 2 mg capsule Take 1 capsule by mouth 4 times daily if needed for Diarrhea. 100 capsule 1     magnesium oxide (MAG-) 400 mg tablet Take 1 tablet by mouth 2 times daily. 180 tablet 4     nadolol (CORGARD) 20 mg tablet TAKE 1 TAB BY MOUTH AT BEDTIME  99     nortriptyline 50 mg capsule Take 2 capsules by mouth at bedtime. 180 capsule 3     pantoprazole (PROTONIX) 40 mg delayed-release tablet Take 1 tablet by mouth once daily. 90 tablet 3     PROPYLENE GLYCOL OPHT Place 1-2 Drops into both ears 3 times daily if needed.       warfarin (COUMADIN) 1 mg tablet Take 1 tablet by mouth once daily. Take 3 mg daily.  0     warfarin (COUMADIN) 2.5 mg tablet Take as directed by [facility] 100 tablet 4     Wheel Chair Wheelchair: lt wt  with leg rests: (Swing away and tilting-- Length of need: 99 months.  PT to evaluate 1 Device 0     No current facility-administered medications for this visit.      Medications have been reviewed by me and are current to the best of my knowledge and ability.      Allergies as of 09/18/2017 - Mack as Reviewed 09/18/2017      Allergen  Reaction Noted     Erythromycin Rash 12/13/2012     Sulfa (sulfonamide antibiotics) Rash 12/13/2012        OBJECTIVE:  /58  Pulse 84  Ht 1.676 m (5' 6\")  Wt 64.4 kg (142 lb)  BMI 22.92 kg/m2  EXAM:  General Appearance: Pleasant, alert, appropriate appearance for age. No acute distress, sitting comfortably in a wheelchair.  Musculoskeletal Exam: Left lower right leg remains in a cast. Right foot is unchanged, with intoeing due to foot drop.  Psychiatric Exam: Alert and oriented, appropriate affect.  Magnesium level is 1.7    ASSESSMENT/PLAN:  Chronic pain. She is doing well with her present hydromorphone dose which is decreased in terms " of morphine equivalents from oxycodone. She was given refills for 3 months starting today, 7-8 tablets daily, with 3 refills which should last her until mid December when she comes back.    Major depression-Dexedrine was also refilled at 5 tablets daily.    Toxicology screen and  was reviewed and were appropriate. All other meds will remain the same.    History of tachycardia possibly also related to the low magnesium. She was encouraged to stay on 2 tablets daily. We will plan to repeat labs again when she comes back in December.  David Cottrell MD ....................  9/18/2017   1:20 PM

## 2017-12-28 NOTE — TELEPHONE ENCOUNTER
Patient Information     Patient Name MRN Sandie Fuentes [id number] Female 1955      Telephone Encounter by Vidhya Cao at 2017  3:22 PM     Author:  Vidhya Cao Service:  (none) Author Type:  (none)     Filed:  2017  3:23 PM Encounter Date:  2017 Status:  Signed     :  Vidhya Cao            WLR - Patient is requesting an order to be able to go to [facility] because it is more difficult to manage on their own than they thought.  Please call.    Vidhya Cao ....................  2017   3:23 PM

## 2017-12-28 NOTE — PROGRESS NOTES
Patient Information     Patient Name MRN Sandie Fuentes [id number] Female 1955      Progress Notes by David Cottrell MD at 2017  2:45 PM     Author:  David Cottrell MD Service:  (none) Author Type:  Physician     Filed:  2017  5:06 PM Encounter Date:  2017 Status:  Signed     :  David Cottrell MD (Physician)            SUBJECTIVE:  62 y.o. female who presents for mobility evaluation. She is to get a wheelchair. She is now totally wheelchair bound because of multiple factors, complicated by the fracture of her left ankle.    The main issues initially where the venous stasis ulcer and the angiopathic ulcer of her left ankle area, and bilateral foot drop which occurred because of rhabdomyolysis. She then suffered the bimalleolar ankle fracture on the left recently, and is being followed up next Monday by Dr. Kothari. She may need surgery, decision to be made next week.    She is on chronic narcotics for chronic pain control because of her micral pathic angioedema. Refills are due at the end of this month.    She wears a brace on her right foot but is unable to walk because of the problems with the left foot. She's been using a wheelchair that she has borrowed but she needs to return. The physical condition of her arms there is adequate for her to be able to maneuver the wheelchair. She cannot use canes or walkers because she cannot be weight bearing.    She needs a light weight chair because of her need to move it about by herself since she lives alone. She will also need a physical therapy and OT evaluation to be sure that she'll be able to maneuver the wheelchair.    With a wheelchair she will be able to participate in her ADLs at home without the risk of falls, which has happened in the past, and caused her to develop rhabdomyolysis with the bilateral foot drop.    Additional Review of Systems: see HPI:   Other than the weakness in the legs and the foot drop she  is having no new symptoms. She did get a new soft cast placed on her left foot recently and as mentioned will be seeing Dr. Kothari next week for orthopedic reconsultation.    Past Medical History:     Diagnosis  Date     Anxiety disorder      Bilateral foot-drop 2016    following rhabdomyolysis      C. difficile colitis 3/7/2015     C. difficile colitis 5/27/2015     Chronic diarrhea 9/17/2015    Dr Woods, [facility]      Chronic pain      DVT (deep venous thrombosis) (HC) 12/2011    bilateral; chronic anticoag      Fingers fractured 07/19/07    fractured right 5th finger      GERD (gastroesophageal reflux disease)      History of rhabdomyolysis 12/2015, 3/2016    Hosp X2 at [facility] in [place]      Hx of ectopic pregnancy     x2      Leg ulcer, left (HC) 06/2010    began 6/2010; venous stasis ulcer; Dr Hayden      Lower extremity venous stasis     followed By Dr Hayden      Major depressive episode      Mesenteric thrombosis (HC) 2015    Lifelong warfarin recommended by gastroenterology      Mood disorder (HC)      Paroxysmal tachycardia (HC)      QT prolongation 4/17/2017     Traumatic rhabdomyolysis (HC) 04/17/2017    hosp'd 7 days at [facility]      Vasculitis (HC) 1/2014    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 Max acetaminophen dose: 4000mg in 24 hrs.       ALPRAZolam (XANAX) 1 mg tablet TAKE ONE TABLET BY MOUTH THREE TIMES DAILY AS NEEDED FOR ANXIETY 90 tablet 2     desipramine 100 mg tablet TAKE 1 TABLET BY MOUTH EVERY DAY 30 tablet 0     desipramine 100 mg tablet Take 1 tablet by mouth at bedtime.       dextroamphetamine (DEXEDRINE) 5 mg tablet Take 2 tablets by mouth three times daily at 8 AM, noon, and 4 PM. 180 tablet 0     diphenoxylate-atropine, 2.5-0.025 mg,  (LOMOTIL) 2.5-0.025 mg tablet Take 1 tablet by mouth 4 times daily if needed for Diarrhea. 90 tablet 5     gabapentin (NEURONTIN) 300 mg capsule Take 3 capsules by mouth 3 times daily. 270 capsule 11     hydrOXYzine pamoate (VISTARIL) 25 mg capsule Take 1-2 capsules by mouth every 6 hours if needed. 100 capsule 3     L.ACID/L.CASEI/B.BIF/B.YUE/FOS (PROBIOTIC BLEND ORAL) Take 1 capsule by mouth once daily.       loperamide (IMODIUM) 2 mg capsule Take by mouth as needed for diarrhea. Take 4mg by mouth with 1st loose stool, then 2mg with each subsequent loose stool. Max 16 mg in 24 hrs.       magnesium oxide (MAG-) 400 mg tablet Take 1 tablet by mouth 2 times daily. 180 tablet 4     multivitamin (MVI) tablet Take 1 tablet by mouth once daily. 60 tablet 0     nadolol (CORGARD) 20 mg tablet TAKE 1 TAB BY MOUTH AT BEDTIME  99     nadolol (CORGARD) 40 mg tablet TAKE 1 TABLET BY MOUTH EVERY DAY. 90 tablet 3     nadolol (CORGARD) 40 mg tablet Take 0.5 tablets by mouth at bedtime.  0     nortriptyline 50 mg capsule Take 2 capsules by mouth at bedtime. 180 capsule 3     oxyCODONE (ROXICODONE) 5 mg immediate release tablet Take 1 tablet by mouth every 4 hours if needed  for Pain 50 tablet 0     oxyCODONE 10 mg tablet 2 tablets tid and one tablet at hs 210 tablet 0     pantoprazole (PROTONIX) 40 mg delayed-release tablet Take 1 tablet by mouth once daily. 90 tablet 3     PROPYLENE GLYCOL OPHT Place 1-2 Drops into both ears 3 times daily if needed.       warfarin (COUMADIN) 2.5 mg tablet Take as directed by [facility] 100 tablet 4     Wheel Chair Wheelchair: lt wt  with leg rests: (Swing away and tilting-- Length of need: 99 months.  PT to evaluate 1 Device 0     No current facility-administered medications for this visit.      Medications have been reviewed by me and are current to the best of my knowledge and ability.      Allergies as of 08/07/2017 - Mack as Reviewed 08/07/2017      Allergen  Reaction Noted      "Erythromycin Rash 12/13/2012     Sulfa (sulfonamide antibiotics) Rash 12/13/2012        OBJECTIVE:  /80  Pulse 76  Ht 1.676 m (5' 6\")  Wt 69.4 kg (153 lb)  Breastfeeding? No  BMI 24.69 kg/m2  EXAM:  General Appearance: Pleasant, alert, appropriate appearance for age. No acute distress, sitting comfortably in a wheelchair.  Musculoskeletal Exam: Her left lower leg is in a soft cast from the knee down. Distal CMS is intact. The foot is somewhat inverted. The right lower leg shows atrophy of the musculature, and there is marked inversion with bilateral foot drop, she is unable to dorsiflex the foot.  Neurologic Exam: Neurologic is otherwise intact and nonfocal  Psychiatric Exam: Alert and oriented, appropriate affect.      ASSESSMENT/PLAN:  Mobility impairment-wheelchair bound-because of bilateral foot drop, chronic pain from vasculitis which is microangiopathic, and a history of rhabdomyolysis. She is able to operate a wheelchair with her arms, with arm strength being adequate. She will need a light weight wheelchair so she can maneuver it around her home. She will be referred for physical therapy and occupational therapy evaluation to confirm. Prescription was given for the wheelchair.    In addition she needs a special pad for the wheelchair to relieve pressure on the sacral area and the box and the sciatic nerve area because of the problems that she's had with rhabdomyolysis. This is added to the prescription for the wheelchair.    30 minutes were spent in reviewing her records, her examination, and completing the forms necessary for the wheelchair and the special pad.  David Cottrell MD ....................  8/7/2017   5:04 PM            "

## 2017-12-28 NOTE — TELEPHONE ENCOUNTER
Patient Information     Patient Name MRN Sandie Fuentes [id number] Female 1955      Telephone Encounter by Xi Blair at 10/20/2017  4:03 PM     Author:  Xi Blair Service:  (none) Author Type:  (none)     Filed:  10/20/2017  4:18 PM Encounter Date:  10/20/2017 Status:  Signed     :  Xi Blair            Patient contacted, she was unable to fill her Dilaudid scrip, and needed a nurse to call the pharmacy to verify her scrip. I called [facility] in [place] and verified.   Xi Blair LPN..............10/20/2017 4:18 PM

## 2017-12-28 NOTE — PROGRESS NOTES
Patient Information     Patient Name MRN Sandie Fuentes [id number] Female 1955      Progress Notes by David Cottrell MD at 2017  2:00 PM     Author:  David Cottrell MD Service:  (none) Author Type:  Physician     Filed:  2017  9:08 AM Encounter Date:  2017 Status:  Signed     :  David Cottrell MD (Physician)            SUBJECTIVE:  62 y.o. female who presents for follow-up of her hospitalization. Once again she was hospitalized after being found on the floor. She states that she took Benadryl in the evening prior to going to bed, only took the 10 mg of oxycodone at about 10:30, and then took Xanax at 8 PM. She worked through most of the night packing and then fell asleep and subsequently found herself on the floor. She thinks she just rolled out of bed because she had things piled on her bed and she only had a small narrow area to sleep on.    She adamantly denies any problems with the Xanax and the oxycodone. We reviewed once again the risks of taking these 2 medications together. She states that she could not survive if she did not have her psychiatric meds as ordered and she needs her pain meds.    Her beta blocker had been decreased from 40 mg to 20 mg at bedtime. She notes that her blood pressures been normal and she's had no increased tachycardia.    Additional Review of Systems: see HPI:   Feeling better now with no new concerns. Leg weakness is improving. She is getting physical therapy.    Past Medical History:     Diagnosis  Date     Anxiety disorder      Bilateral foot-drop     following rhabdomyolysis      C. difficile colitis 3/7/2015     C. difficile colitis 2015     Chronic diarrhea 2015    Dr Woods, [facility]      Chronic pain      DVT (deep venous thrombosis) (HC) 2011    bilateral; chronic anticoag      Fingers fractured 07    fractured right 5th finger      GERD (gastroesophageal reflux disease)      History of  rhabdomyolysis 12/2015, 3/2016    Hosp X2 at [facility] in [place]      Hx of ectopic pregnancy     x2      Leg ulcer, left (HC) 06/2010    began 6/2010; venous stasis ulcer; Dr Hayden      Lower extremity venous stasis     followed By Dr Hayden      Major depressive episode      Mood disorder (HC)      Paroxysmal tachycardia (HC)      QT prolongation 4/17/2017     Traumatic rhabdomyolysis (HC) 04/17/2017    hosp'd 7 days at [facility]      Vasculitis (HC) 1/2014    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 Max acetaminophen dose: 4000mg in 24 hrs.       ALPRAZolam (XANAX) 1 mg tablet Take 1 tablet by mouth 3 times daily if needed for Anxiety. 54 tablet 0     desipramine 100 mg tablet Take 1 tablet by mouth at bedtime.       dextroamphetamine (DEXEDRINE) 5 mg tablet Take 2 tablets by mouth three times daily at 8 AM, noon, and 4 PM. 54 tablet 0     diphenoxylate-atropine, 2.5-0.025 mg, (LOMOTIL) 2.5-0.025 mg tablet Take 1 tablet by mouth 4 times daily if needed for Diarrhea. 90 tablet 5     gabapentin (NEURONTIN) 300 mg capsule Take 3 capsules by mouth 3 times daily. 270 capsule 11     hydrOXYzine pamoate (VISTARIL) 25 mg capsule Take 1-2 capsules by mouth every 6 hours if needed. 100 capsule 3     L.ACID/L.CASEI/B.BIF/B.YUE/FOS (PROBIOTIC BLEND ORAL) Take 1 capsule by mouth once daily.       loperamide (IMODIUM) 2 mg capsule Take by mouth as needed for diarrhea. Take 4mg by mouth with 1st loose stool, then 2mg with each subsequent loose stool. Max 16 mg in 24 hrs.       magnesium oxide (MAG-) 400 mg tablet Take 1 tablet by mouth 2 times daily. 180 tablet 4     multivitamin (MVI) tablet Take 1 tablet by mouth once daily. 60 tablet 0     nadolol (CORGARD) 40 mg tablet Take 0.5 tablets by  "mouth at bedtime.  0     nortriptyline 50 mg capsule Take 2 capsules by mouth at bedtime. 180 capsule 3     oxyCODONE 10 mg tablet 2 tablets tid and one tablet at hs 90 tablet 0     pantoprazole (PROTONIX) 40 mg delayed-release tablet Take 1 tablet by mouth once daily. 90 tablet 3     PROPYLENE GLYCOL OPHT Place 1-2 Drops into both ears 3 times daily if needed.       warfarin (COUMADIN) 2.5 mg tablet Take 2.5 mg x six days/week and 1.25 mg x one day/week. Or as [facility] 4     No current facility-administered medications for this visit.      Medications have been reviewed by me and are current to the best of my knowledge and ability.      Allergies as of 06/12/2017 - Mack as Reviewed 06/12/2017      Allergen  Reaction Noted     Erythromycin Rash 12/13/2012     Sulfa (sulfonamide antibiotics) Rash 12/13/2012        OBJECTIVE:  /80  Pulse 100  Resp 20  Ht 1.676 m (5' 6\")  Breastfeeding? No  EXAM:  General Appearance: Pleasant, alert, appropriate appearance for age. No acute distress  Chest/Respiratory Exam: Normal chest wall and respirations. Clear to auscultation.  Cardiovascular Exam: Regular rate and rhythm. S1, S2, no murmur, click, gallop, or rubs.  Musculoskeletal Exam: Weakness of the legs persists with foot drop.  Neurologic Exam: No new findings  Psychiatric Exam: Alert and oriented, appropriate affect.  Becomes quite defensive when discussing her use of her medications      ASSESSMENT/PLAN:  Appears stable at this time. She is in the nursing home. I discussed once again the need to attempt to taper either the benzodiazepines and/or the narcotics. Reviewed all her medications with her. We will keep things the same for now but when she is discharged from the nursing home reevaluate in again begin to attempt to taper.    She is due for INR tomorrow which will be done at the nursing home and adjusted Coumadin by the pro time clinic. Continue all other medications the " same.    She is now been on warfarin for approximately 21 months. Records were reviewed. 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  David Cottrell MD ....................  6/13/2017   9:02 AM

## 2017-12-28 NOTE — ADDENDUM NOTE
Patient Information     Patient Name MRN Sandie Fuentes [id number] Female 1955      Addendum Note by Frank Hayden MD at 2017  4:45 PM     Author:  Frank Hayden MD Service:  (none) Author Type:  Physician     Filed:  2017  4:45 PM Encounter Date:  2017 Status:  Signed     :  Frank Hayden MD (Physician)       Addended by: FRANK HAYDEN on: 2017 04:45 PM        Modules accepted: Orders

## 2017-12-28 NOTE — TELEPHONE ENCOUNTER
Patient Information     Patient Name MRN Sandie Fuentes [id number] Female 1955      Telephone Encounter by Chrissy Williamson at 2017  2:24 PM     Author:  Chrissy Williamson Service:  (none) Author Type:  (none)     Filed:  2017  2:26 PM Encounter Date:  2017 Status:  Signed     :  Chrissy Williamson            Pt has an appt on the .   She is thinking she is

## 2017-12-28 NOTE — CARE COORDINATION
Patient Information     Patient Name MRN Sandie Fuentes [id number] Female 1955      Case Mgmt by Monisha Valdes LSW at 2017 10:55 AM     Author:  Monisha Valdes LSW  Service:  (none) Author Type:  SWS- Licensed Social Worker     Filed:  2017  3:57 PM  Date of Service:  2017 10:55 AM Status:  Addendum     :  Monisha Valdes LSW ([facility]- Licensed Social Worker)        Related Notes: Original Note by Monisha Valdes LSW ([facility]- Licensed Social Worker) filed at 2017  1:50 PM            :  Patient requested to speak with .  This writer met with patient in her room.  Patient was recently at [facility] and would like to know her options to go back.  A call was placed to Monisha at [facility].  Patient's supplemental insurance will cover her co-pay.  She has approximately 60 days left.  Provided this information to patient.   [facility] is able to accept patient over the weekend if ready for discharge.  TAMI Henderson ....................  2017   11:00 AM    Addendum:  Anticipated discharge Saturday.  Updated given to Monisha at [facility].  PAS screen completed.  The confirmation number is [id number].  TAMI Henderson ....................  2017   3:57 PM

## 2017-12-28 NOTE — TELEPHONE ENCOUNTER
Patient Information     Patient Name MRN Sandie Fuentes [id number] Female 1955      Telephone Encounter by Ruby Gupta at 2017  9:15 AM     Author:  Ruby Gupat Service:  (none) Author Type:  (none)     Filed:  2017  9:16 AM Encounter Date:  2017 Status:  Signed     :  Ruby Gupta            Left message to call back.    Ruby Gupta ....................  2017   9:16 AM

## 2017-12-28 NOTE — TELEPHONE ENCOUNTER
Patient Information     Patient Name MRN Sandie Fuentes [id number] Female 1955      Telephone Encounter by Jennifer Faith at 10/19/2017  9:24 AM     Author:  Jennifer Faith Service:  (none) Author Type:  (none)     Filed:  10/19/2017  9:25 AM Encounter Date:  10/18/2017 Status:  Signed     :  Jennifer Faith            Called [facility] in [facility] and left message to return call  Jennifer Chand ....................  10/19/2017   9:25 AM

## 2017-12-28 NOTE — PATIENT INSTRUCTIONS
Patient Information     Patient Name MRN Sandie Fuentes [id number] Female 1955      Patient Instructions by Avelina Bourne RN at 2017 12:46 PM     Author:  Avelina Bourne RN Service:  (none) Author Type:  NURS- Registered Nurse     Filed:  2017 12:50 PM Encounter Date:  2017 Status:  Signed     :  Avelina Bourne RN (NURS- Registered Nurse)            2017 Details    Sun u Fri Sat         1               2               3                 4               5               6               7               8               9      1.25 mg   See details      10      2.5 mg           11      2.5 mg         12      1.25 mg         13      2.5 mg         14               15               16               17                 18               19               20               21               22               23               24                 25               26               27               28               29               30                 Date Details    This INR check       Date of next INR:  2017         How to take your warfarin dose     To take:  1.25 mg Take half of a 2.5 mg tablet.    To take:  2.5 mg Take one of the 2.5 mg tablets.             Description          Take 2.5 mg x 2 days and 1.25 mg x 2 days. Recheck on 17. Avelina Bourne RN    2017  12:49 PM            Anticoagulation Summary as of 2017     INR goal 2.0-3.0    Today's INR 2.1    Next INR check 2017          Call your Anticoagulation Clinic at Dept: [phone]   if:   1. Any medications are started, stopped, or there is a change in dose.  2. You experience any bleeding that is not easily stopped or if it is recurrent.  3. You notice an increase in bruising or any bruising that does not heal.  4. You are scheduled for surgery, colonoscopy, dental extraction or any other procedure where you may need to stop your Coumadin (warfarin).

## 2017-12-28 NOTE — TELEPHONE ENCOUNTER
Patient Information     Patient Name MRN Sandie Fuentes [id number] Female 1955      Telephone Encounter by Bam Narayanan RN at 2017  8:54 AM     Author:  Bam Narayanan RN Service:  (none) Author Type:  NURS- Registered Nurse     Filed:  2017  8:56 AM Encounter Date:  2017 Status:  Signed     :  Bam Narayanan RN (NURS- Registered Nurse)            Beta Blockers     Office visit in the past 12 months or per provider note.    Last visit with DAVID MENDEZ was on: 2017 in [facility]  Next visit with DAVID MENDEZ is on: 2017 in [facility]  Next visit with Family Practice is on: 2017 in [facility]    Max refill for 12 months from last office visit or per provider note.  Prescription refilled per RN Medication Refill Policy.................... BAM NARAYANAN, HOLLY ....................  2017   8:54 AM      Medication is not on refill protocol. Unable to complete prescription refill per RN Medication Refill Policy.................... BAM NARAYANAN, HOLLY ....................  2017   8:54 AM        This is a Refill request from: [facility]  Name of Medication: Desipramine 100 mg tablet  Quantity requested: 30  Last fill date: 17  Last visit with DAVID MENDEZ was on: 2017 in [facility]  PCP:  David Mendez MD  Controlled Substance Agreement:  na   Diagnosis r/t this medication request: not listed

## 2017-12-28 NOTE — TELEPHONE ENCOUNTER
Patient Information     Patient Name MRN Sex Sandie Kearns [id number] Female 1955      Telephone Encounter by Renetta Jj at 2017  3:24 PM     Author:  Renetta Jj Service:  (none) Author Type:  (none)     Filed:  2017  3:25 PM Encounter Date:  2017 Status:  Signed     :  Renetta Jj            WLR-Pt needs Hosp F/U appt next week. Thank You.  Renetta Jj ....................  2017   3:24 PM

## 2017-12-28 NOTE — TELEPHONE ENCOUNTER
Patient Information     Patient Name MRSandie Ward [id number] Female 1955      Telephone Encounter by Ruby Gupta at 2017  4:10 PM     Author:  Ruby Gupta Service:  (none) Author Type:  (none)     Filed:  2017  4:11 PM Encounter Date:  2017 Status:  Signed     :  Ruby Gupta            Called and spoke with patient. I told her that she should call [facility] to see how she should go about being admitted to [facility]. She states she has left a message with a , I told her to await the call back and to let us know if there is anything else we could do.    Ruby Gupta ....................  2017   4:11 PM

## 2017-12-28 NOTE — TELEPHONE ENCOUNTER
Patient Information     Patient Name MRN Sex Sandie Kearns [id number] Female 1955      Telephone Encounter by Enid Newberry at 2017  8:53 AM     Author:  Enid Newberry Service:  (none) Author Type:  (none)     Filed:  2017  8:53 AM Encounter Date:  2017 Status:  Signed     :  Enid Newberry            Spoke with pharmacist in [facility] and given OK to fill dilaudid as ordered.  Chasidy Newberry LPN ...... 2017 8:53 AM

## 2017-12-28 NOTE — PROGRESS NOTES
Patient Information     Patient Name MRN Sandie Fuentes [id number] Female 1955      Progress Notes by Carie Guerra RN at 6/3/2017  6:46 AM     Author:  Carie Guerra RN Service:  (none) Author Type:  NURS- Registered Nurse     Filed:  6/3/2017  6:47 AM Date of Service:  6/3/2017  6:46 AM Status:  Signed     :  Carie Guerra RN (NURS- Registered Nurse)            Patient removed part of toenail prior to admission. Bleeding small amounts. Area is swollen. Dressing change initiated to right great toe.

## 2017-12-28 NOTE — PROGRESS NOTES
Patient Information     Patient Name MRN Sex Sandie Kearns [id number] Female 1955      Progress Notes by Thalia Lowery MD at 2017 12:00 PM     Author:  Thalia Lowery MD Service:  (none) Author Type:  Physician     Filed:  2017  4:31 PM Encounter Date:  2017 Status:  Signed     :  Thalia Lowery MD (Physician)            See tosin harris

## 2017-12-28 NOTE — TELEPHONE ENCOUNTER
Patient Information     Patient Name MRN Sex Sandie Kearns [id number] Female 1955      Telephone Encounter by Tata Whalen at 2017 11:13 AM     Author:  Tata Whalen Service:  (none) Author Type:  (none)     Filed:  2017 11:14 AM Encounter Date:  2017 Status:  Signed     :  Tata Whalen            Patient has appointment 17 with primary doctor .  Tata Whalen LPN ....................2017  11:14 AM

## 2017-12-28 NOTE — ADDENDUM NOTE
Patient Information     Patient Name MRN Sandie Fuentes [id number] Female 1955      Addendum Note by Elena Baker at 2017  9:37 AM     Author:  Elena Baker Service:  (none) Author Type:  (none)     Filed:  2017  9:37 AM Encounter Date:  2017 Status:  Signed     :  Elena Baker       Addended by: ELENA BAKER on: 2017 09:37 AM        Modules accepted: Orders

## 2017-12-28 NOTE — TELEPHONE ENCOUNTER
Patient Information     Patient Name MRN Sandie Fuentes [id number] Female 1955      Telephone Encounter by Bam Hernandez RN at 2017  9:55 AM     Author:  Bam Hernandez RN Service:  (none) Author Type:  NURS- Registered Nurse     Filed:  2017  9:58 AM Encounter Date:  2017 Status:  Signed     :  Bam Hernandez RN (NURS- Registered Nurse)            Discontinued 17, patient no longer taking.  Unable to complete prescription refill per RN Medication Refill Policy.................... BAM HERNANDEZ RN ....................  2017   9:57 AM

## 2017-12-28 NOTE — TELEPHONE ENCOUNTER
Patient Information     Patient Name MRN Sandie Fuentes [id number] Female 1955      Telephone Encounter by David Cottrell MD at 2017 12:04 PM     Author:  David Cottrell MD Service:  (none) Author Type:  Physician     Filed:  2017 12:05 PM Encounter Date:  2017 Status:  Signed     :  David Cottrell MD (Physician)            Agree with the OT recommendation. Okay to proceed with OT.  David Cottrell MD ....................  2017   12:04 PM

## 2017-12-28 NOTE — ADDENDUM NOTE
Patient Information     Patient Name MRN Sandie Fuentes [id number] Female 1955      Addendum Note by Frank Hayden MD at 2017  4:28 PM     Author:  Frank Hayden MD Service:  (none) Author Type:  Physician     Filed:  2017  4:28 PM Encounter Date:  2017 Status:  Signed     :  Frank Hayden MD (Physician)       Addended by: FRANK HAYDEN on: 2017 04:28 PM        Modules accepted: Orders

## 2017-12-28 NOTE — TELEPHONE ENCOUNTER
Patient Information     Patient Name MRN Sandie Fuentes [id number] Female 1955      Telephone Encounter by Daya Bourne RN at 2017  7:25 AM     Author:  Daya Bourne RN Service:  (none) Author Type:  NURS- Registered Nurse     Filed:  2017  7:28 AM Encounter Date:  2017 Status:  Signed     :  Daya Bourne RN (NURS- Registered Nurse)            Patient called to have Rx sent to [facility] for [facility] to obtain from there as theya re closed. Okayed by Dr. ADRIANO Solorzano and called to [facility] as prescribed by Dr. Lowery.

## 2017-12-28 NOTE — PROGRESS NOTES
Patient Information     Patient Name MRN Sandie Fuentes [id number] Female 1955      Progress Notes by David Cottrell MD at 2017  2:45 PM     Author:  David Cottrell MD Service:  (none) Author Type:  Physician     Filed:  2017  8:54 AM Encounter Date:  2017 Status:  Signed     :  David Cottrell MD (Physician)            SUBJECTIVE:  62 y.o. female who presents originally for a mobility evaluation, however because of a bimalleolar ankle fracture suffered 3 days ago, she was seen by Dr. Woods in orthopedics and then will be referred to [facility] because of her multiple comorbidities including a left foot drop and open wound on the left leg. She was seen earlier today as well by Dr. Hayden. Her left ankle was run over by her wheelchair accidentally when her sisters were helping her to pack up getting ready to move to [place] in the fall. She is in the process now of getting a cast placed and then will be referred for consultation to [facility].    Because of this finding we elected to refill her pain meds which are due on . In addition she'll need additional meds to cover the pain from her ankle fracture.    She states she's been getting along reasonably well until this happened. She plans to come in later to discuss the wheelchair. In addition she has been told that she may benefit from surgery on her right foot to attempt to correct the right foot drop and the fact that the foot constantly inverts.    Her pain is been relatively well controlled. Her pain management contract is up-to-date. 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    She has restarted her stimulant, Dexedrine, due to increasing depression symptoms. She is feeling much better now.    Additional Review of Systems: see HPI:   No new symptoms otherwise    Past Medical History:     Diagnosis  Date     Anxiety disorder      Bilateral foot-drop 2016    following  rhabdomyolysis      C. difficile colitis 3/7/2015     C. difficile colitis 5/27/2015     Chronic diarrhea 9/17/2015    Dr Woods, [facility]      Chronic pain      DVT (deep venous thrombosis) (HC) 12/2011    bilateral; chronic anticoag      Fingers fractured 07/19/07    fractured right 5th finger      GERD (gastroesophageal reflux disease)      History of rhabdomyolysis 12/2015, 3/2016    Hosp X2 at [facility] in [place]      Hx of ectopic pregnancy     x2      Leg ulcer, left (HC) 06/2010    began 6/2010; venous stasis ulcer; Dr Hayden      Lower extremity venous stasis     followed By Dr Hayden      Major depressive episode      Mesenteric thrombosis (HC) 2015    Lifelong warfarin recommended by gastroenterology      Mood disorder ()      Paroxysmal tachycardia ()      QT prolongation 4/17/2017     Traumatic rhabdomyolysis (HC) 04/17/2017    hosp'd 7 days at [facility]      Vasculitis () 1/2014    Microangiopathic vasculitis diagnosed at the [facility] several years ago causing her ulcer in her leg         Current Outpatient Prescriptions       Medication  Sig Dispense Refill     acetaminophen (TYLENOL EXTRA STRGTH) 500 mg tablet Take 1,000 mg by mouth every 8 hours. Max acetaminophen dose: 4000mg in 24 hrs.       ALPRAZolam (XANAX) 1 mg tablet TAKE ONE TABLET BY MOUTH THREE TIMES DAILY AS NEEDED FOR ANXIETY 90 tablet 2     desipramine 100 mg tablet Take 1 tablet by mouth at bedtime.       dextroamphetamine (DEXEDRINE) 5 mg tablet Take 2 tablets by mouth three times daily at 8 AM, noon, and 4 PM. 180 tablet 0     diphenoxylate-atropine, 2.5-0.025 mg, (LOMOTIL) 2.5-0.025 mg tablet Take 1 tablet by mouth 4 times daily if needed for Diarrhea. 90 tablet 5     gabapentin (NEURONTIN) 300 mg capsule Take 3 capsules by mouth 3 times daily. 270 capsule 11     hydrOXYzine pamoate (VISTARIL) 25 mg capsule Take 1-2 capsules by mouth every 6 hours if needed. 100 capsule 3     L.ACID/L.CASEI/B.BIF/B.YUE/FOS (PROBIOTIC  BLEND ORAL) Take 1 capsule by mouth once daily.       loperamide (IMODIUM) 2 mg capsule Take by mouth as needed for diarrhea. Take 4mg by mouth with 1st loose stool, then 2mg with each subsequent loose stool. Max 16 mg in 24 hrs.       magnesium oxide (MAG-) 400 mg tablet Take 1 tablet by mouth 2 times daily. 180 tablet 4     multivitamin (MVI) tablet Take 1 tablet by mouth once daily. 60 tablet 0     nadolol (CORGARD) 20 mg tablet TAKE 1 TAB BY MOUTH AT BEDTIME  99     nadolol (CORGARD) 40 mg tablet Take 0.5 tablets by mouth at bedtime.  0     nortriptyline 50 mg capsule Take 2 capsules by mouth at bedtime. 180 capsule 3     oxyCODONE (ROXICODONE) 5 mg immediate release tablet Take 1 tablet by mouth every 4 hours if needed  for Pain 50 tablet 0     oxyCODONE 10 mg tablet 2 tablets tid and one tablet at hs 210 tablet 0     pantoprazole (PROTONIX) 40 mg delayed-release tablet Take 1 tablet by mouth once daily. 90 tablet 3     PROPYLENE GLYCOL OPHT Place 1-2 Drops into both ears 3 times daily if needed.       warfarin (COUMADIN) 2.5 mg tablet Take as directed by [facility] 100 tablet 4     No current facility-administered medications for this visit.      Medications have been reviewed by me and are current to the best of my knowledge and ability.      Allergies as of 07/24/2017 - Mack as Reviewed 07/24/2017      Allergen  Reaction Noted     Erythromycin Rash 12/13/2012     Sulfa (sulfonamide antibiotics) Rash 12/13/2012        OBJECTIVE:  There were no vitals taken for this visit.  EXAM:  General Appearance: She is sitting in a wheelchair in the process of having a cast placed by the orthopedic nurse. She is in no acute distress, but in obvious pain from the fracture. She states her chronic pain is fairly well controlled.  Psychiatric Exam: Alert and oriented, appropriate affect.  Does not appear significantly depressed      ASSESSMENT/PLAN:  Chronic pain management. She is due for a refill July 31 and  this was given today.  was reviewed and was appropriate. Controlled substance contract and toxicology screens are up-to-date. We'll plan to see her back again at the end of July or in early August, when she is seen in [place] for her fracture.    Bimalleolar ankle fracture left foot. I reviewed the x-ray. She was given an additional prescription for 5 mg oxycodone, 50 tablets, to use to treat the fracture pain over and above her chronic pain meds, until she is seen in [place].    History of depression-refilled Dexedrine for one month.    Foot drop-will review the possibility of surgical treatment of this problem at her upcoming visit.  David Cottrell MD ....................  7/25/2017   8:54 AM

## 2017-12-28 NOTE — PROGRESS NOTES
Patient Information     Patient Name MRN Sex Sandie Kearns [id number] Female 1955      Progress Notes by Tori Puckett RPh at 6/3/2017 11:07 AM     Author:  Tori Puckett RPh Service:  (none) Author Type:  PHARM- Pharmacist     Filed:  6/3/2017 11:17 AM Date of Service:  6/3/2017 11:07 AM Status:  Signed     :  Tori Puckett RPh (PHARM- Pharmacist)            Pharmacy Consult- Coumadin Education    PharmD. Counseled patient regarding warfarin (Coumadin) therapy today.     Sandie Stuart is a 62 y.o. female admitted on 2017 for/with Fall at home.    Patient's Primary Indication for Anticoagulation: History of DVT    Goal INR:  2 to 3    Patient Active Problem List     Diagnosis  Code     GERD K21.9     Pain medication agreement signed 2017 Z02.89     Vitamin D deficiency E55.9     Major depressive episode F32.9     History of iron deficiency anemia Z86.2     DVT (deep venous thrombosis), bilateral I82.403     Anticoagulation monitoring, special range [V58.61] Z79.01     Peripheral edema R60.9     Chronic pain syndrome G89.4     Bilateral foot-drop M21.371, M21.372     Venous stasis ulcer of left lower extremity (HC) I83.029     Dyspeptic diarrhea K52.9     Chronic diarrhea K52.9     Vasculitis (HC) microangiopathic-diagnosed at [facility] I77.6     Venous stasis LLE I87.8     Traumatic rhabdomyolysis (HC) T79.6XXA     Fall at home W19.XXXA, Y92.099     QT prolongation R94.31     Elevated INR R79.1     Paroxysmal tachycardia (HC) I47.9       Attempted to discuss warfarin (Coumadin) with patient. As a nurse, she is aware of warfarin issues.    Discussed interaction with STOPPING questran. Provided patient with information regarding the need for INR monitoring, Goal INR, and recent INR trends.        Recent Labs          17   0540  17   1910  17   1445  17   0805   HGB   --   14.4   --    --    INR  1.3 H  8.4 HH  8.8 HH  4.5 H   PLT   --   311   --     "--         Plan: Next scheduled protime at [facility] is Tuesday, June 6. It was noted that the patient's last warfarin dose was prior to admission and that the patient's next warfarin dose was due tonight, Cinthya 3 of 1.25 mg (1/2 of warfarin 2.5mg).    The patient was provided with a pink protime card and a \"Guide to Coumadin/Warfarin Therapy\".    Patient stated understanding and all questions were addressed.     Thank You.    Tori Puckett [facility] ....................  6/3/2017   11:07 AM          "

## 2017-12-28 NOTE — TELEPHONE ENCOUNTER
Patient Information     Patient Name MRN Sandie Fuentes [id number] Female 1955      Telephone Encounter by Renetta Jj at 10/20/2017  2:36 PM     Author:  Renetta Jj Service:  (none) Author Type:  (none)     Filed:  10/20/2017  2:37 PM Encounter Date:  10/20/2017 Status:  Signed     :  Renetta Jj            WLR-Pt still waiting to have Rx filled in [facility]. They will not accept her written script and need to speak with Dr. She doesn't have enough to make it thru the weekend. Thank You.  Renetta Jj ....................  10/20/2017   2:37 PM

## 2017-12-28 NOTE — PATIENT INSTRUCTIONS
Patient Information     Patient Name MRN Sandie Fuentes [id number] Female 1955      Patient Instructions by Pilar Luis RN at 7/10/2017  3:15 PM     Author:  Pilar Luis RN Service:  (none) Author Type:  NURS- Registered Nurse     Filed:  7/10/2017  3:20 PM Encounter Date:  7/10/2017 Status:  Signed     :  Pilar Luis RN (NURS- Registered Nurse)            2017 Details    Sun Mon Tue Wed Thu Fri Sat           1                 2               3               4               5               6               7               8                 9               10      1.25 mg   See details      11      1.25 mg         12      1.25 mg         13      1.25 mg         14      2.5 mg         15      1.25 mg           16      1.25 mg         17      1.25 mg         18               19               20               21               22                 23               24               25               26               27               28               29                 30               31                     Date Details   07/10 This INR check       Date of next INR:  2017         How to take your warfarin dose     To take:  1.25 mg Take half of a 2.5 mg tablet.    To take:  2.5 mg Take one of the 2.5 mg tablets.             Description          INR/POCT 7.1, sent to lab for peripheral INR. INR 6.9.  Will hold Warfarin x two days and recheck on 14.   PILAR LUIS RN ....................  2017   3:15 PM    Reviewed signs and symptoms of bleeding, including extreme caution against falling and hitting head. Patient advised to seek medical attention if any bleeding or injury occurs. Follow-up per protocol.  Updated RAMANDEEP Mae.    Will try to eat salad/broccoli today.            Anticoagulation Summary as of 7/10/2017     INR goal 2.0-3.0    Today's INR 2.5    Next INR check 2017          Call your Anticoagulation Clinic at Dept: [phone]   if:    1. Any medications are started, stopped, or there is a change in dose.  2. You experience any bleeding that is not easily stopped or if it is recurrent.  3. You notice an increase in bruising or any bruising that does not heal.  4. You are scheduled for surgery, colonoscopy, dental extraction or any other procedure where you may need to stop your Coumadin (warfarin).

## 2017-12-28 NOTE — PROGRESS NOTES
Patient Information     Patient Name MRN Sandie Fuentes [id number] Female 1955      Progress Notes by Belkis Parker RN at 2017  6:34 PM     Author:  Belkis Parker RN Service:  (none) Author Type:  NURS- Registered Nurse     Filed:  2017  6:34 PM Date of Service:  2017  6:34 PM Status:  Signed     :  Belkis Parker RN (NURS- Registered Nurse)            Problem: PAIN  Goal: VERBALIZES/DISPLAYS ADEQUATE COMFORT LEVEL OR BASELINE COMFORT LEVEL  Outcome: Problem reviewed and goal still appropriate  Patient rates pain an 8/10 in LLE.  Stated pain is constant.  Pain meds administered per MAR.   Patient refusing other interventions.  Will continue to monitor. Belkis Parker RN ....................  2017   6:34 PM

## 2017-12-28 NOTE — TELEPHONE ENCOUNTER
Patient Information     Patient Name MRN Sandie Fuentes [id number] Female 1955      Telephone Encounter by Avelina Bourne RN at 2017  1:38 PM     Author:  Avelina Bourne RN Service:  (none) Author Type:  NURS- Registered Nurse     Filed:  2017  1:43 PM Encounter Date:  2017 Status:  Signed     :  Avelina Bourne RN (NURS- Registered Nurse)            Spoke with patient regarding INR of 3.2. Patient is unsure of dose she has been taking in the last week. Says she may have been alternating 1.25 mg and 2.5 mg. Patient told to take 1.25 mg x 6 days and 2.5 mg x 1 day on . And to get appt in protime in 1 week. Avelina Bourne RN    2017  1:43 PM

## 2017-12-28 NOTE — TELEPHONE ENCOUNTER
Patient Information     Patient Name MRN Sex Sandie Kearns [id number] Female 1955      Telephone Encounter by Melissa Hurtado at 7/3/2017  1:58 PM     Author:  Melissa Hurtado Service:  (none) Author Type:  NURS- Registered Nurse     Filed:  7/3/2017  2:00 PM Encounter Date:  2017 Status:  Signed     :  Melissa Hurtado (NURS- Registered Nurse)            This is a Refill request from: [facility]    Last visit with DAVID MENDEZ was on: 2017 in [facility]  PCP:  David Mendez MD  Controlled Substance Agreement:  2016    Diagnosis r/t this medication request:Anxiety     Unable to complete prescription refill per RN Medication Refill Policy.................... MELISSA HURTADO RN ....................  7/3/2017   1:59 PM

## 2017-12-28 NOTE — TELEPHONE ENCOUNTER
Patient Information     Patient Name MRN Sandie Fuentes [id number] Female 1955      Telephone Encounter by Bev Wong CNA at 2017  9:37 AM     Author:  Bev Wong CNA Service:  (none) Author Type:  NURS- Nurse Assist/Care Tech     Filed:  2017  9:40 AM Encounter Date:  2017 Status:  Signed     :  Bev Wong CNA (NURS- Nurse Assist/Care Tech)            Patient is being admitted to [facility] this afternoon for INR management and Physical Therapy. After speaking with patient, she would benefit from [facility] Home [facility] for bathing assistance and adaptive equipment. If you agree to this plan, a simple OK is sufficient. Thank you    Christy Wong LPN  Home Care Coordinator.

## 2017-12-28 NOTE — TELEPHONE ENCOUNTER
Patient Information     Patient Name MRN Sandie Fuentes [id number] Female 1955      Telephone Encounter by Jennifer Faith at 2017  9:06 AM     Author:  Jennifer Faith Service:  (none) Author Type:  (none)     Filed:  2017  9:06 AM Encounter Date:  2017 Status:  Signed     :  Jennifer Faith            Left message to call back.  Jennifer Faith LPN....................  2017   9:06 AM

## 2017-12-28 NOTE — TELEPHONE ENCOUNTER
Patient Information     Patient Name MRN Sex Sandie Kearns [id number] Female 1955      Telephone Encounter by David Cottrell MD at 2017 10:46 AM     Author:  David Cottrell MD Service:  (none) Author Type:  Physician     Filed:  2017 10:57 AM Encounter Date:  2017 Status:  Signed     :  David Cottrell MD (Physician)            Discussed the culture results with Dr. Briggs. She and I both agree that this likely represents colonization. She plans on giving preop antibiotics, and did not recommend any antibiotics now.  David Cottrell MD ....................  2017   10:56 AM

## 2017-12-28 NOTE — TELEPHONE ENCOUNTER
Patient Information     Patient Name MRN Sandie Fuentes [id number] Female 1955      Telephone Encounter by Monisha Lawson RN at 10/10/2017 10:47 AM     Author:  Monisha Lawson RN  Service:  (none) Author Type:  NURS- Registered Nurse     Filed:  10/10/2017 12:26 PM  Encounter Date:  10/8/2017 Status:  Addendum     :  Monisha Lawson RN (NURS- Registered Nurse)        Related Notes: Original Note by Monisha Lawson RN (NURS- Registered Nurse) filed at 10/10/2017 11:59 AM            ,  Pharmacy requesting refill for Desipramine 100mg tablet, take once daily.  Pt has moved to [place] and is requesting refill from there.  Last visit with PCP was 2017 where she stated she would be coming back for a visit in December.  Please review and advise or sign if appropriate.  Medication daren'd up per request.      This is a Refill request from: [facility] in [place]  Name of Medication: Desipramine  Quantity requested: 30  Last fill date: 2017  Due for refill: yes  Last visit with DAVID MENDEZ was on: 2017 in [facility]  PCP:  aDvid Mendez MD  Controlled Substance Agreement:  NA   Diagnosis r/t this medication request: Recurrent major depressive disorder     Unable to complete prescription refill per RN Medication Refill Policy.................... Monisha Lawson RN ....................  10/10/2017   10:48 AM

## 2017-12-28 NOTE — PROGRESS NOTES
Patient Information     Patient Name MRN Sandie Fuentes [id number] Female 1955      Progress Notes by Tori Puckett RPh at 6/3/2017 11:05 AM     Author:  Tori Puckett RPh Service:  (none) Author Type:  PHARM- Pharmacist     Filed:  6/3/2017 11:07 AM Date of Service:  6/3/2017 11:05 AM Status:  Signed     :  Tori Puckett RPh (PHARM- Pharmacist)            Pharmacy: Discharge Counseling and Medication Reconciliation    Sandie Stuart  [address] [address]    Home Phone [phone]   Work Phone Not on file.   Mobile [phone]     62 y.o. female  PCP:David Cottrell MD    Allergies     Allergen  Reactions     Erythromycin Rash     Sulfa (Sulfonamide Antibiotics) Rash       Discharge Counseling:    Pharmacist met with patient today to review the medication portion of the After Visit Summary (with an emphasis on CHANGES to medications) and to address patient's questions/concerns.     Summary of Education: Reviewed changes to medications. Reviewed warfarin.    Materials Provided:   MedCounselor sheets printed from Clinical Pharmacology on: none (except for warfarin information, see other note)    Discharge Medication Reconciliation:    Tori Puckett RPh has reviewed the patient's discharge medication orders and has compared them to the inpatient medication administration record and to what the patient was taking prior to admission- any discrepancies have been resolved.     It has been determined that the patient has an adequate supply of medications available or which can be obtained from the patient's preferred pharmacy, which staff and she has confirmed as: [facility] due to [facility]'s usual/home pharmacy. An updated medication list will be faxed to the patient's pharmacies.     Thank you for the consult.     Tori Puckett RPh ....................  6/3/2017   11:06 AM

## 2017-12-28 NOTE — PROGRESS NOTES
Patient Information     Patient Name MRN Sex Sandie Kearns [id number] Female 1955      Progress Notes by Tori Puckett RPh at 6/3/2017  7:31 AM     Author:  Tori Puckett RPh  Service:  (none) Author Type:  PHARM- Pharmacist     Filed:  6/3/2017 11:18 AM  Date of Service:  6/3/2017  7:31 AM Status:  Addendum     :  Tori Puckett RPh (PHARM- Pharmacist)        Related Notes: Original Note by Tori Puckett RPh (PHARM- Pharmacist) filed at 6/3/2017  7:57 AM            Pharmacy Consult- Warfarin Follow-Up    Sandie Stuart is a 62 y.o. female admitted on 2017 with Fall at home    Primary Indication(s) for Anticoagulation: recurrent DVT    Goal INR: 2 to 3    Patient Active Problem List     Diagnosis  Code     GERD K21.9     Pain medication agreement signed 2017 Z02.89     Vitamin D deficiency E55.9     Major depressive episode F32.9     History of iron deficiency anemia Z86.2     DVT (deep venous thrombosis), bilateral I82.403     Anticoagulation monitoring, special range [V58.61] Z79.01     Peripheral edema R60.9     Chronic pain syndrome G89.4     Bilateral foot-drop M21.371, M21.372     Venous stasis ulcer of left lower extremity (HC) I83.029     Dyspeptic diarrhea K52.9     Chronic diarrhea K52.9     Vasculitis (HC) microangiopathic-diagnosed at [facility] I77.6     Venous stasis LLE I87.8     Traumatic rhabdomyolysis (HC) T79.6XXA     Fall at home W19.XXXA, Y92.099     QT prolongation R94.31     Elevated INR R79.1       New factors that may increase patient's sensitivity to warfarin (Coumadin) include: bloody drainage from wound, compliance?    New factors that may decrease patient's sensitivity to warfarin (Coumadin) include: compliance?    Dietary Intake: Eating 50 to 100%    Recent Labs          17   0540  17   1910  17   1445  17   0805   HGB   --   14.4   --    --    INR  1.3 H  8.4 HH  8.8 HH  4.5 H   PLT   --   311   --    --          Recent Dosing:    Date INR Dose Given   6/1 8.4 none   6/2 none No warfarin, 2.5 mg vitamin K   6/3 1.3 See below       Plan: Give warfarin 2.5 mg po today x1. INR in AM.  (If discharged today, consider daily INRs or warfarin 1.25 mg daily)    Thank You for the Consult. Will continue to follow.    Tori Puckett [facility] ....................  6/3/2017   7:31 AM    Plan: Give warfarin 1.25 mg po daily.    Thank You for the Consult. Will continue to follow.    Tori Puckett [facility] ....................  6/3/2017   7:31 AM

## 2017-12-29 NOTE — PATIENT INSTRUCTIONS
Patient Information     Patient Name MRN Sex Sandie Kearns [id number] Female 1955      Patient Instructions by Thalia Lowery MD at 2017 12:00 PM     Author:  Thalia Lowery MD  Service:  (none) Author Type:  Physician     Filed:  2017 12:57 PM  Encounter Date:  2017 Status:  Addendum     :  Thalia Lowery MD (Physician)        Related Notes: Original Note by Thalia Lowery MD (Physician) filed at 2017 12:55 PM            Discontinue warfarin 5 days prior to surgery  Need PT/INR on Monday  Start Lovenox on saturday PM, Sun am and Pm and MOnday AM, do not give on MOnday PM

## 2017-12-29 NOTE — ED NOTES
Patient Information     Patient Name MRN Sex Sandie Kearns [id number] Female 1955      ED Nursing Note by Spring Bolton RN at 2017 10:19 PM     Author:  Spring Bolton RN Service:  (none) Author Type:  NURS- Registered Nurse     Filed:  2017 10:19 PM Date of Service:  2017 10:19 PM Status:  Signed     :  Spring Bolton RN (NURS- Registered Nurse)            Patient resting, recieving some chicken broth d/t being cold.  VSS. Continues with left leg pain, given PRN oxycodone.

## 2017-12-29 NOTE — PATIENT INSTRUCTIONS
Patient Information     Patient Name MRN Sandie Fuentes [id number] Female 1955      Patient Instructions by Bam Hernandez RN at 2017  1:31 PM     Author:  Bam Hernandez RN Service:  (none) Author Type:  NURS- Registered Nurse     Filed:  2017  1:36 PM Encounter Date:  2017 Status:  Signed     :  Bam Hernandez RN (NURS- Registered Nurse)            2017 Details    Sun  Fri Sat         1               2               3                 4               5               6               7               8               9               10                 11               12               13               14               15               16               17                 18               19               20               21               22      2.5 mg   See details      23      1.25 mg         24      2.5 mg           25      1.25 mg         26      2.5 mg         27               28               29               30                 Date Details    This INR check       Date of next INR:  2017         How to take your warfarin dose     To take:  1.25 mg Take half of a 2.5 mg tablet.    To take:  2.5 mg Take one of the 2.5 mg tablets.             Description          Take 2.5mg x 2 days and 1.25mg x 2 days, recheck on 17.  BAM HERNANDEZ RN ....................  2017   1:35 PM          Anticoagulation Summary as of 2017     INR goal 2.0-3.0    Today's INR 1.9    Next INR check 2017      Patient not here, [employer] Communication sheet with screening questions and current INR received via FAX from outside agency. Results reviewed, Warfarin dosing per protocol, and recommended follow-up appointment made. Paperwork FAXED back to facility.     Call your Anticoagulation Clinic at Dept: [phone]   if:   1. Any medications are started, stopped, or there is a change in dose.  2. You experience any bleeding that is not easily  stopped or if it is recurrent.  3. You notice an increase in bruising or any bruising that does not heal.  4. You are scheduled for surgery, colonoscopy, dental extraction or any other procedure where you may need to stop your Coumadin (warfarin).

## 2017-12-29 NOTE — ED NOTES
Patient Information     Patient Name MRN Sex Sandie Kearns [id number] Female 1955      ED Nursing Note by Spring Bolton RN at 2017  8:24 PM     Author:  Spring Bolton RN Service:  (none) Author Type:  NURS- Registered Nurse     Filed:  2017  8:24 PM Date of Service:  2017  8:24 PM Status:  Signed     :  Spring Bolton RN (NURS- Registered Nurse)            Urine sample sent to lab

## 2017-12-29 NOTE — PATIENT INSTRUCTIONS
Patient Information     Patient Name MRN Sandie Fuentes [id number] Female 1955      Patient Instructions by Pilar Luis RN at 2017  2:45 PM     Author:  Pilar Luis RN Service:  (none) Author Type:  NURS- Registered Nurse     Filed:  2017  3:16 PM Encounter Date:  2017 Status:  Signed     :  Pilar Luis RN (NURS- Registered Nurse)            2017 Details    Sun  Fri Sat           1                 2               3               4               5      Hold   See details      6      Hold         7      1.25 mg         8                 9               10               11               12               13               14               15                 16               17               18               19               20               21               22                 23               24               25               26               27               28               29                 30               31                     Date Details    This INR check       Date of next INR:  2017         How to take your warfarin dose     To take:  1.25 mg Take half of a 2.5 mg tablet.    Hold Do not take your warfarin dose. The details table to the right may include additional information.                  Description          INR/POCT 7.1, sent to lab for peripheral INR. INR 6.9.  Will hold Warfarin x two days and recheck on 14.   PILAR LUIS RN ....................  2017   3:15 PM    Reviewed signs and symptoms of bleeding, including extreme caution against falling and hitting head. Patient advised to seek medical attention if any bleeding or injury occurs. Follow-up per protocol.            Anticoagulation Summary as of 2017     INR goal 2.0-3.0    Today's INR 6.9!    Next INR check 2017          Call your Anticoagulation Clinic at Dept: [phone]   if:   1. Any medications are started, stopped, or there is a  change in dose.  2. You experience any bleeding that is not easily stopped or if it is recurrent.  3. You notice an increase in bruising or any bruising that does not heal.  4. You are scheduled for surgery, colonoscopy, dental extraction or any other procedure where you may need to stop your Coumadin (warfarin).

## 2017-12-29 NOTE — ED NOTES
Patient Information     Patient Name MRN Sex Sandie Kearns [id number] Female 1955      ED Nursing Note by Spring Bolton RN at 2017  7:16 PM     Author:  Spring Bolton RN  Service:  (none) Author Type:  NURS- Registered Nurse     Filed:  2017  7:18 PM  Date of Service:  2017  7:16 PM Status:  Addendum     :  Spring Bolton RN (NURS- Registered Nurse)        Related Notes: Original Note by Spring Bolton RN (NURS- Registered Nurse) filed at 2017  7:17 PM            Patient has chronic ulcer on left lower lateral leg being treated by Dr. Hayden.  Has Unna boot in place last seen and changed by Dr. Hayden this last Monday.  CMS is intact, extremity is warm with chronic decreased sensation.

## 2017-12-29 NOTE — PATIENT INSTRUCTIONS
Patient Information     Patient Name MRN Sandie Fuentes [id number] Female 1955      Patient Instructions by Ivonne Roberts RN at 2017  1:49 PM     Author:  Ivonne Roberts RN  Service:  (none) Author Type:  NURS- Registered Nurse     Filed:  2017  1:56 PM  Encounter Date:  2017 Status:  Addendum     :  Ivonne Roberts RN (NURS- Registered Nurse)        Related Notes: Original Note by Ivonne Roberts RN (NURS- Registered Nurse) filed at 2017  1:55 PM            2017 Details    Sun Mon Tue Wed Thu Fri Sat         1               2               3                 4               5               6               7               8               9               10                 11               12               13               14               15               16               17                 18               19               20               21               22               23               24                 25               26      1.25 mg   See details      27      2.5 mg         28      1.25 mg         29      2.5 mg         30      1.25 mg           Date Details    This INR check               How to take your warfarin dose     To take:  1.25 mg Take half of a 2.5 mg tablet.    To take:  2.5 mg Take one of the 2.5 mg tablets.           2017 Details    Sun Mon Tue Wed Thu Fri Sat           1      2.5 mg           2      2.5 mg         3      2.5 mg         4               5               6               7               8                 9               10               11               12               13               14               15                 16               17               18               19               20               21               22                 23               24               25               26               27               28               29                 30               31                     Date Details   No  additional details    Date of next INR:  7/3/2017         How to take your warfarin dose     To take:  2.5 mg Take one of the 2.5 mg tablets.             Description          Take 1/2 dose today (1.25mg) then resume normal dosing recheck in one week. SAMUEL RIGGINS RN ....................  6/26/2017   1:53 PM          Anticoagulation Summary as of 6/26/2017     INR goal 2.0-3.0    Today's INR 3.9!    Next INR check 7/3/2017          Call your Anticoagulation Clinic at Dept: [phone]   if:   1. Any medications are started, stopped, or there is a change in dose.  2. You experience any bleeding that is not easily stopped or if it is recurrent.  3. You notice an increase in bruising or any bruising that does not heal.  4. You are scheduled for surgery, colonoscopy, dental extraction or any other procedure where you may need to stop your Coumadin (warfarin).

## 2017-12-29 NOTE — ED NOTES
Patient Information     Patient Name MRN Sex Sandie Kearns [id number] Female 1955      ED Nursing Note by Spring Bolton RN at 2017  9:48 PM     Author:  Spring Bolton RN  Service:  (none) Author Type:  NURS- Registered Nurse     Filed:  2017  9:49 PM  Date of Service:  2017  9:48 PM Status:  Addendum     :  Spring Bolton RN (NURS- Registered Nurse)        Related Notes: Original Note by Spring Boltno RN (NURS- Registered Nurse) filed at 2017  9:48 PM            Patient back from xray.  States her leg pain is 9/10 after being repositioned in xray and requesting something for pain, will notify MD.  Assisted to get comfortable in bed, given warm blanket.   W

## 2017-12-29 NOTE — ED NOTES
Patient Information     Patient Name MRN Sex Sandie Kearns [id number] Female 1955      ED Nursing Note by Spring Bolton RN at 2017  8:13 PM     Author:  Spring Bolton RN  Service:  (none) Author Type:  NURS- Registered Nurse     Filed:  2017  8:14 PM  Date of Service:  2017  8:13 PM Status:  Addendum     :  Spring Bolton RN (NURS- Registered Nurse)        Related Notes: Original Note by Spring Bolton RN (NURS- Registered Nurse) filed at 2017  8:14 PM            Patient able to bend left leg and lift up to use bed pan.  Patient also given some water for dry mouth.

## 2017-12-29 NOTE — ED NOTES
Patient Information     Patient Name MRN Sex Sandie Kearns [id number] Female 1955      ED Nursing Note by Spring Bolton RN at 2017 11:18 PM     Author:  Spring Bolton RN Service:  (none) Author Type:  NURS- Registered Nurse     Filed:  2017 11:21 PM Date of Service:  2017 11:18 PM Status:  Signed     :  Spring Bolton RN (NURS- Registered Nurse)            Transfer note    Data:  Sandie Stuart transferred to  via cart at 2322.    Equipment used for transport: IVF continue during transfer.    Action:  Receiving unit notified of transfer: Yes.   Patient notified of room change.  Report given to HOLLY Mccall at 2310.  Belongings sent to receiving unit.  Accompanied by Nursing Assistant.    Response:  Patient is alert, oriented and agreement to transfer.  C/o pain 8/10 in lower left leg.  IVF continue and patient is on room air.  VSS before transfer.

## 2017-12-29 NOTE — PATIENT INSTRUCTIONS
Patient Information     Patient Name MRN Sandie Funetes [id number] Female 1955      Patient Instructions by Pilar Vyas RN at 2017 12:30 PM     Author:  Pilar Vyas RN Service:  (none) Author Type:  NURS- Registered Nurse     Filed:  2017 12:14 PM Encounter Date:  2017 Status:  Signed     :  Pilar Vyas RN (NURS- Registered Nurse)            2017 Details    Sun Mon Tue Wed Thu Fri Sat          1               2                 3               4               5               6               7               8               9                 10               11               12               13               14               15               16                 17               18      3 mg   See details      19      3 mg         20      3 mg         21      3 mg         22      3 mg         23      3 mg           24      3 mg         25      3 mg         26      3 mg         27      3 mg         28      3 mg         29      3 mg         30      3 mg          Date Details    This INR check               How to take your warfarin dose     To take:  3 mg Take three of the 1 mg tablets.           2017 Details    Sun Mon Tue Wed Thu Fri Sat     1      3 mg         2      3 mg         3               4               5               6               7                 8               9               10               11               12               13               14                 15               16               17               18               19               20               21                 22               23               24               25               26               27               28                 29               30               31                    Date Details   No additional details    Date of next INR:  10/2/2017         How to take your warfarin dose     To take:  3 mg Take three of the 1 mg tablets.              Description          Continue same Warfarin dose and recheck in 2 weeks.  SANDIP LUIS RN ....................  9/18/2017   12:13 PM  Will be leaving to [place] 9/21/17, she plans to have INRs drawn there.          Anticoagulation Summary as of 9/18/2017     INR goal 2.0-3.0    Today's INR 2.7    Next INR check 10/2/2017          Call your Anticoagulation Clinic at Dept: [phone]   if:   1. Any medications are started, stopped, or there is a change in dose.  2. You experience any bleeding that is not easily stopped or if it is recurrent.  3. You notice an increase in bruising or any bruising that does not heal.  4. You are scheduled for surgery, colonoscopy, dental extraction or any other procedure where you may need to stop your Coumadin (warfarin).

## 2017-12-29 NOTE — PATIENT INSTRUCTIONS
Patient Information     Patient Name MRN Sandie Fuentes [id number] Female 1955      Patient Instructions by Avelina Bourne RN at 2017 10:11 AM     Author:  Avelina Bourne RN Service:  (none) Author Type:  NURS- Registered Nurse     Filed:  2017 10:19 AM Encounter Date:  2017 Status:  Signed     :  Avelina Bourne RN (NURS- Registered Nurse)            2017 Details    Sun  Thu Fri Sat         1               2               3                 4               5               6      2.5 mg   See details      7      2.5 mg         8      2.5 mg         9      2.5 mg         10                 11               12               13               14               15               16               17                 18               19               20               21               22               23               24                 25               26               27               28               29               30                 Date Details    This INR check       Date of next INR:  2017         How to take your warfarin dose     To take:  2.5 mg Take one of the 2.5 mg tablets.             Description          Take 2.5 mg x 3 days. Recheck on 17. Avelina Bourne RN    2017  10:17 AM            Anticoagulation Summary as of 2017     INR goal 2.0-3.0    Today's INR 1.1!    Next INR check 2017          Call your Anticoagulation Clinic at Dept: [phone]   if:   1. Any medications are started, stopped, or there is a change in dose.  2. You experience any bleeding that is not easily stopped or if it is recurrent.  3. You notice an increase in bruising or any bruising that does not heal.  4. You are scheduled for surgery, colonoscopy, dental extraction or any other procedure where you may need to stop your Coumadin (warfarin).  Patient not here, [employer] Communication sheet with screening questions and current INR received via FAX from  outside agency. Results reviewed, Warfarin dosing per protocol, and recommended follow-up appointment made. Paperwork FAXED back to facility.

## 2017-12-29 NOTE — ED NOTES
Patient Information     Patient Name MRN Sex Sandie Kearns [id number] Female 1955      ED Nursing Note by Spring Bolton RN at 2017  7:04 PM     Author:  Spring Bolton RN Service:  (none) Author Type:  NURS- Registered Nurse     Filed:  2017  7:09 PM Date of Service:  2017  7:04 PM Status:  Signed     :  Spring Bolton RN (NURS- Registered Nurse)            Lab at bedside.  Patient states that she does not remember the fall.  Went to bed at 0430 and feels like she slipped off the side of the bed and slept there until found by friend.  Friend started calling around 1330 and when could not reach her came to residence to check on patient at 1530 and pt was found on floor next to her bed.  Pt states when found she was awake and conscious.  Was assisted of floor by friend and since has had pain and not able to bear weight on left leg.

## 2017-12-29 NOTE — ED NOTES
Patient Information     Patient Name MRN Sex Sandie Kearns [id number] Female 1955      ED Nursing Note by Spring Bolton RN at 2017  7:45 PM     Author:  Spring Bolton RN Service:  (none) Author Type:  NURS- Registered Nurse     Filed:  2017  7:45 PM Date of Service:  2017  7:45 PM Status:  Signed     :  Spring Bolton RN (NURS- Registered Nurse)            Pt to CT via cart

## 2017-12-29 NOTE — ED NOTES
Patient Information     Patient Name MRN Sex Sandie Kearns [id number] Female 1955      ED Nursing Note by Spring Bolton RN at 2017  9:25 PM     Author:  Spring Bolton RN Service:  (none) Author Type:  NURS- Registered Nurse     Filed:  2017  9:30 PM Date of Service:  2017  9:25 PM Status:  Signed     :  Spring Bolton RN (NURS- Registered Nurse)            Patient to xray via cart.

## 2017-12-29 NOTE — H&P
Patient Information     Patient Name MRN Sandie Funetes [id number] Female 1955      H&P by Thalia Lowery MD at 2017 12:00 PM     Author:  Thalia Lowery MD Service:  (none) Author Type:  Physician     Filed:  2017  4:31 PM Encounter Date:  2017 Status:  Signed     :  Thalia Lowery MD (Physician)            ----------------- PREOPERATIVE EXAM ------------------  2017    SUBJECTIVE:  Sandie Stuart is a 62 y.o. female here for preoperative optimization.    Nursing Notes:   Yasmeen Vee  2017 12:34 PM  Signed  Sandie Stuart is a 62 y.o. female (1955) who presents for ORIF left ankle     Date of Surgery: 17  Surgical Specialty: Orthopedic/Spine  DR Kostas Briggs  [facility] Facility: [facility]   Surgery type: inpatient  Primary Physician: David Cottrell        HPI:  Presents for preoperative clearance for elective procedure schedule for Tuesday. New patient to me.  She had mild left ankle pain and nonhealing ulcer on left lateral leg. Otherwise has no complaints  Unknown fracture a few weeks ago  She is on coumadin for mesenteric thrombosis, recurrent, recommend lifelong   No history of bleeding disorders, fever, chills, cough or cold symptoms  She lives alone  Recent refill of chronic pain medications    Fever/Chills or other infectious symptoms in past month:  (NO)   >10lb weight loss in past two months:  (NO)     Health Care Directive/Code status:   Hx of blood transfusions:   (NO)   History of VRE/MRSA:  (NO) Date:  Site:         Patient Active Problem List       Diagnosis  Date Noted     Injury of left ankle and foot  2017     Skin ulcer of left foot with fat layer exposed (HC)  2017     Closed displaced bimalleolar fracture of left ankle  2017     Paroxysmal tachycardia (HC)  2017     Elevated INR  2017     Traumatic rhabdomyolysis (HC)  2017     Fall at home  2017      QT prolongation  04/17/2017     Venous stasis LLE  02/01/2017     Venous stasis ulcer of left lower extremity (HC)  09/14/2016     Bilateral foot-drop  08/30/2016     Dyspeptic diarrhea  07/18/2016     Peripheral edema  04/04/2016     Chronic pain syndrome  03/30/2016     Left L/E        Anticoagulation monitoring, special range [V58.61]  09/22/2015     DVT (deep venous thrombosis), bilateral  09/21/2015     History of iron deficiency anemia  03/23/2015     Major depressive episode  01/22/2014     Vitamin D deficiency  01/10/2014     Vasculitis (HC) microangiopathic-diagnosed at [facility]  01/01/2014     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  Dextroamphetamine-15 mg morning and noon-60 per month-10 mg at 3-4 PM-30 per month        GERD         Past Medical History:     Diagnosis  Date     Anxiety disorder      Bilateral foot-drop 2016    following rhabdomyolysis      C. difficile colitis 3/7/2015     C. difficile colitis 5/27/2015     Chronic diarrhea 9/17/2015    Dr Woods, GI [facility]      Chronic pain      DVT (deep venous thrombosis) (HC) 12/2011    bilateral; chronic anticoag      Fingers fractured 07/19/07    fractured right 5th finger      GERD (gastroesophageal reflux disease)      History of rhabdomyolysis 12/2015, 3/2016    Hosp X2 at [facility] in [place]      Hx of ectopic pregnancy     x2      Leg ulcer, left (HC) 06/2010    began 6/2010; venous stasis ulcer; Dr Hayden      Lower extremity venous stasis     followed By Dr Hayden      Major depressive episode      Mesenteric thrombosis (HC) 2015    Lifelong warfarin recommended by gastroenterology      Mood disorder (HC)      Paroxysmal tachycardia (HC)      QT prolongation 4/17/2017     Traumatic rhabdomyolysis (HC) 04/17/2017    hosp'd 7 days  at [facility]      Vasculitis (HC) 1/2014    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        Past Surgical History:      Procedure  Laterality Date     BUNIONECTOMY Left 5/2006    w/claw toes, later fractures       COLECTOMY Right 3/1/15    Right partial colectomy and ileum; Dr Hayden       COLONOSCOPY  2012    [place]--normal       ECTOPIC PREGNANCY SURGERY  Years ago    Twice       Hx closed reduction      R arm X2, childhood       Hx CTR Bilateral 1991,1993     Hx lipoma removal Left 1994    chest wall       Hx salpingectomy Bilateral 1981,1983    for ectopic pregnancies         Current Outpatient Prescriptions       Medication  Sig Dispense Refill     acetaminophen (TYLENOL EXTRA STRGTH) 500 mg tablet Take 1,000 mg by mouth every 8 hours. Max acetaminophen dose: 4000mg in 24 hrs.       ALPRAZolam (XANAX) 1 mg tablet TAKE ONE TABLET BY MOUTH THREE TIMES DAILY AS NEEDED FOR ANXIETY 90 tablet 2     desipramine 100 mg tablet TAKE 1 TABLET BY MOUTH EVERY DAY 30 tablet 0     dextroamphetamine (DEXEDRINE) 5 mg tablet Take 2 tablets by mouth three times daily at 8 AM, noon, and 4 PM. 180 tablet 0     diphenoxylate-atropine, 2.5-0.025 mg, (LOMOTIL) 2.5-0.025 mg tablet Take 1 tablet by mouth 4 times daily if needed for Diarrhea. 90 tablet 5     enoxaparin (LOVENOX) 40 mg/0.4 mL injection Inject 40 mg subcutaneous every 12 hours for 4 doses. Start on sat evening through monday morning 1.6 mL 0     gabapentin (NEURONTIN) 300 mg capsule Take 3 capsules by mouth 3 times daily. 270 capsule 11     hydrOXYzine pamoate (VISTARIL) 25 mg capsule Take 1-2 capsules by mouth every 6 hours if needed. 100 capsule 3     L.ACID/L.CASEI/B.BIF/B.YUE/FOS (PROBIOTIC BLEND ORAL) Take 1 capsule by mouth once daily.       loperamide (IMODIUM) 2 mg capsule Take by mouth as needed for diarrhea. Take 4mg by mouth with 1st loose stool, then 2mg with each subsequent loose stool. Max 16 mg in 24 hrs.        magnesium oxide (MAG-) 400 mg tablet Take 1 tablet by mouth 2 times daily. 180 tablet 4     nadolol (CORGARD) 20 mg tablet TAKE 1 TAB BY MOUTH AT BEDTIME  99     nortriptyline 50 mg capsule Take 2 capsules by mouth at bedtime. 180 capsule 3     oxyCODONE (ROXICODONE) 5 mg immediate release tablet Take 1 tablet by mouth every 4 hours if needed  for Pain 50 tablet 0     oxyCODONE 10 mg tablet 2 tablets tid and one tablet at hs 210 tablet 0     pantoprazole (PROTONIX) 40 mg delayed-release tablet Take 1 tablet by mouth once daily. 90 tablet 3     PROPYLENE GLYCOL OPHT Place 1-2 Drops into both ears 3 times daily if needed.       warfarin (COUMADIN) 2.5 mg tablet Take as directed by [facility] 100 tablet 4     Wheel Chair Wheelchair: lt wt  with leg rests: (Swing away and tilting-- Length of need: 99 months.  PT to evaluate 1 Device 0     No current facility-administered medications for this visit.      Medications have been reviewed by me and are current to the best of my knowledge and ability.    Recent use of: warfarin stopped last night    Allergies:  Allergies     Allergen  Reactions     Erythromycin Rash     Sulfa (Sulfonamide Antibiotics) Rash     Latex allergy  yes  Immunizations:  Immunization History     Administered  Date(s) Administered     Influenza, IIV3 (Age >=3 years) 01/07/2014     Influenza, IIV4 (Age >= 3 Years) 10/21/2014, 10/22/2015, 10/04/2016     Pneumococcal Poly,23-Valent (Pneumovax) 10/31/2011     Tdap 10/31/2011, 04/16/2017       No family history on file.    Denies family hx of bleeding tendencies, anesthesia complications, or other problems with surgery.    Social History        Substance Use Topics          Smoking status:   Former Smoker      Packs/day:  0.50      Start date:  1/1/1950      Quit date:  3/31/1975      Smokeless tobacco:   Never Used      Alcohol use   0.0 oz/week     0 Standard drinks or equivalent per week        Comment: 6/month         ROS:   "  Surgical:  patient denies previous complications from prior surgeries including but not limited to prolonged bleeding, anesthesia complications, dysrhythmias, surgical wound infections, or prolonged hospital stay.  Cardiorespiratory: denies chest pain, palpitations, shortness of breath, cough. Most exertion in the past 2 weeks was standing  Complete ROS otherwise negative except as noted in HPI.     -------------------------------------------------------------    PHYSICAL EXAM:  /76  Pulse 74  Temp 97.9  F (36.6  C) (Oral)  Ht 1.676 m (5' 6\")  Wt 62.1 kg (137 lb)  SpO2 99%  BMI 22.11 kg/m2    EXAM:  General Appearance: Pleasant, alert, appropriate appearance for age. No acute distress  Head Exam: Normal. Normocephalic, atraumatic.  Eyes: PERRL, EOMI  Ears: Normal TM's bilaterally.   OroPharynx: Mucosa pink and moist. Dentition in good repair.  Neck: Supple, no masses or nodes, no lymphadenopathy.  No thyromegaly.  Lungs: Normal chest wall and respirations. Clear to auscultation, no wheezes or crackles.  Cardiovascular: Regular rate and rhythm. S1, S2, no murmurs.  Gastrointestinal: Soft, nontender, no abnormal masses or organomegaly. BS normal   Musculoskeletal: deformed left ankle, 3 x 3 cm left lateral wound, pulses intact  Psychiatric Exam: Alert and oriented, appropriate affect.    Results for orders placed or performed in visit on 08/18/17       COMPLETE METABOLIC PANEL       Result  Value Ref Range Status    SODIUM 133 133 - 143 mmol/L Final    POTASSIUM 4.5 3.5 - 5.1 mmol/L Final    CHLORIDE 102 98 - 107 mmol/L Final    CO2,TOTAL 27 21 - 31 mmol/L Final    ANION GAP 4 (L) 5 - 18                 Final    GLUCOSE 91 70 - 105 mg/dL Final    CALCIUM 9.6 8.6 - 10.3 mg/dL Final    BUN 18 7 - 25 mg/dL Final    CREATININE 0.80 0.70 - 1.30 mg/dL Final    BUN/CREAT RATIO           23                 Final    GFR if African American >60 >60 ml/min/1.73m2 Final    GFR if not  >60 >60 " ml/min/1.73m2 Final    ALBUMIN 3.6 3.5 - 5.7 g/dL Final    PROTEIN,TOTAL 7.0 6.4 - 8.9 g/dL Final    GLOBULIN                  3.4 2.0 - 3.7 g/dL Final    A/G RATIO 1.1 1.0 - 2.0                 Final    BILIRUBIN,TOTAL 0.2 (L) 0.3 - 1.0 mg/dL Final    ALK PHOSPHATASE 103 34 - 104 IU/L Final    ALT (SGPT) 17 7 - 52 IU/L Final    AST (SGOT) 19 13 - 39 IU/L Final   PROTIME-INR       Result  Value Ref Range Status    INR 1.2 <1.3 Final    PROTIME 13.8 11.9 - 15.2 sec Final   CBC WITH AUTO DIFFERENTIAL       Result  Value Ref Range Status    WHITE BLOOD COUNT         5.2 4.5 - 11.0 thou/cu mm Final    RED BLOOD COUNT           4.84 4.00 - 5.20 mil/cu mm Final    HEMOGLOBIN                13.4 12.0 - 16.0 g/dL Final    HEMATOCRIT                40.9 33.0 - 51.0 % Final    MCV                       85 80 - 100 fL Final    MCH                       27.7 26.0 - 34.0 pg Final    MCHC                      32.8 32.0 - 36.0 g/dL Final    RDW                       14.1 11.5 - 15.5 % Final    PLATELET COUNT            359 140 - 440 thou/cu mm Final    MPV                       9.2 6.5 - 11.0 fL Final    NEUTROPHILS               58.3 42.0 - 72.0 % Final    LYMPHOCYTES               26.8 20.0 - 44.0 % Final    MONOCYTES                 9.3 <12.0 % Final    EOSINOPHILS               3.7 <8.0 % Final    BASOPHILS                 1.7 <3.0 % Final    IMMATURE GRANULOCYTES(METAS,MYELOS,PROS) 0.2 % Final    ABSOLUTE NEUTROPHILS      3.0 1.7 - 7.0 thou/cu mm Final    ABSOLUTE LYMPHOCYTES      1.4 0.9 - 2.9 thou/cu mm Final    ABSOLUTE MONOCYTES        0.5 <0.9 thou/cu mm Final    ABSOLUTE EOSINOPHILS      0.2 <0.5 thou/cu mm Final    ABSOLUTE BASOPHILS        0.1 <0.3 thou/cu mm Final    ABSOLUTE IMMATURE GRANULOCYTES(METAS,MYELOS,PROS) 0.0 <=0.3 thou/cu mm Final       EKG:  appears normal, NSR  ---------------------------------------------------------------    ASSESSEMENT AND PLAN:     Sandie was seen today for preoperative  exam.    Diagnoses and all orders for this visit:    Preop examination    Closed displaced bimalleolar fracture of left ankle, sequela    QT prolongation    Bilateral foot-drop    Chronic pain syndrome    Vasculitis (HC) microangiopathic-diagnosed at [facility]  -     COMPLETE METABOLIC PANEL; Future  -     CBC WITH DIFFERENTIAL; Future  -     PROTIME-INR; Future  -     COMPLETE METABOLIC PANEL  -     CBC WITH DIFFERENTIAL  -     PROTIME-INR  -     CBC WITH AUTO DIFFERENTIAL    Anticoagulation management encounter  -     PROTIME-INR; Future  -     PROTIME-INR  -     enoxaparin (LOVENOX) 40 mg/0.4 mL injection; Inject 40 mg subcutaneous every 12 hours for 4 doses. Start on sat evening through monday morning    Leg ulcer, left, with unspecified severity (HC)  -     AEROBIC BACTERIAL CULTURE, STAIN; Future  -     Cancel: AEROBIC BACTERIAL CULTURE, STAIN  -     WOUND CULTURE, STAIN; Future  -     WOUND CULTURE, STAIN        PRE OP RECOMMENDATIONS:    No family history of problems with bleeding or anesthesia.  No cardiopulmonary workup is neccessary for the current procedure. Please contact the office with any questions or concerns.    Patient is on chronic pain medications (YES);   Patient is on antiplatlet/anticoagulation (YES)  Other medications that need adjustment perioperatively (YES)    Other:  Patient was advised to call our office and the surgical services with any change in condition or new symptoms if they were to develop between today and their surgical date; especially any cardiopulmonary symptoms or symptoms concerning for an infection.    Recommendations:   Bridging with lovenox  Wound has been stable per surgery notes, she requested culture of wound despite signs of infection      Patient Instructions   Discontinue warfarin 5 days prior to surgery  Need PT/INR on Monday  Start Lovenox on saturday PM, Sun am and Pm and MOnday AM, do not give on MOnday PM    Thalia Hopper MD  4:31 PM  8/18/2017

## 2017-12-29 NOTE — PATIENT INSTRUCTIONS
Patient Information     Patient Name MRN Sex Sandie Kearns [id number] Female 1955      Patient Instructions by Avelina Bourne RN at 2017 12:30 PM     Author:  Avelina Bourne RN Service:  (none) Author Type:  NURS- Registered Nurse     Filed:  2017  1:00 PM Encounter Date:  2017 Status:  Signed     :  Avelina Bourne RN (NURS- Registered Nurse)            2017 Details    Sun Mon Tue Wed Thu Fri Sat          1               2                 3               4               5               6               7               8      3 mg   See details      9      3 mg           10      3 mg         11      3 mg         12      3 mg         13      3 mg         14      3 mg         15      3 mg         16      3 mg           17      3 mg         18      3 mg         19               20               21               22               23                 24               25               26               27               28               29               30                Date Details    This INR check       Date of next INR:  2017         How to take your warfarin dose     To take:  3 mg Take three of the 1 mg tablets.             Description          Continue same Warfarin dose and recheck in 1 week. appts coordinated for 17. Will be leaving to [place] 17.  Avelina Bourne RN   2017    12:58 PM                Anticoagulation Summary as of 2017     INR goal 2.0-3.0    Today's INR 2.9    Next INR check 2017          Call your Anticoagulation Clinic at Dept: [phone]   if:   1. Any medications are started, stopped, or there is a change in dose.  2. You experience any bleeding that is not easily stopped or if it is recurrent.  3. You notice an increase in bruising or any bruising that does not heal.  4. You are scheduled for surgery, colonoscopy, dental extraction or any other procedure where you may need to stop your Coumadin (warfarin).

## 2017-12-29 NOTE — H&P
Patient Information     Patient Name MRN Sandie Fuentes [id number] Female 1955      H&P by Telly Solorzano MD at 2017  2:47 PM     Author:  Telly Solorzano MD Service:  (none) Author Type:  Physician     Filed:  2017  3:30 PM Date of Service:  2017  2:47 PM Status:  Signed     :  Telly Solorzano MD (Physician)            OBSERVATION HISTORY AND PHYSICAL    Sandie Stuart   [address]  [address] [facility] [address]  Date of face-to-face patient visit: 2017  Admission Date/Time: 2017  6:42 PM    Primary Care Provider / Referring Physician:  David Cottrell MD  Hospital Attending Physician:  Telly Solorzano MD  Informant:  Electronic Health Record, patient and Dr. Lawson, On Call MD; pt is well known to me from recent hosp stay.    ADVANCED CARE PLAN/CODE STATUS: DNR    CHIEF COMPLAINT: I don't know what happened.    HPI:  Pt is a 62 y.o. female who was working in her apartment cleaning and getting ready to moves some things around her apartment with Dr Leigh.  She worked on her apartment until 4:30 in the morning on 2017.  She took her usual oxycodone 20 mg and her usual xanax 1 mg.  She went to bed.  She woke up several hours later sleeping on the floor.  She does not recall falling.  She has chronic left leg pain which is about the same.  She has not recently changed her medications and has been using the same amount and using them in the same pattern for several months.  She denies taking any extra doses.  She was just discharged from [facility] last week after her week long hospital stay in mid-April.    She has been eating and drinking her usual amounts.    Denies any other drug use or alcohol use.      She saw Dr Hayden on Wednesday, 2017, and her left leg was completely healed again.  An unna boot was put on again to allow more time for her skin to heal.    REVIEW OF SYSTEMS:    Constitutional: has not recently felt ill; no fevers, chills  or sweats  Eyes: no changes in her vision; no double, blurry or lost vision  Ears, nose, mouth, throat, and face: no facial or mouth pain; no bleeding from her mouth or nose  Respiratory: no sob or cough  Cardiovascular: no palpitations; no chest pain or pressure  Gastrointestinal: no heartburn, nausea or pain; no constipation; her diarrhea has been controlled with the meds a few times a day; she had a normal formed stool this morning; no melena or BRBPR  Genitourinary: no pain or burning or blood with urination  Integument/breast: no new bruises or abrasions  Hematologic/lymphatic: no bleeding from anywhere  Musculoskeletal: no change in her usual left leg pains that affect her entire leg  Neurological: no dizziness or lightheadedness when working around her apartment    Past Medical History:     Diagnosis  Date     Anxiety disorder      Bilateral foot-drop 2016    following rhabdomyolysis      C. difficile colitis 3/7/2015     C. difficile colitis 5/27/2015     Chronic diarrhea 9/17/2015    Dr Woods, [facility]      Chronic pain      DVT (deep venous thrombosis) (HC) 12/2011    bilateral; chronic anticoag      Fingers fractured 07/19/07    fractured right 5th finger      GERD (gastroesophageal reflux disease)      History of rhabdomyolysis 12/2015, 3/2016    Hosp X2 at [facility] in [place]      Hx of ectopic pregnancy     x2      Leg ulcer, left (HC) 06/2010    began 6/2010; venous stasis ulcer; Dr Hayden      Lower extremity venous stasis     followed By Dr Hayden      Major depressive episode      Mood disorder (HC)      QT prolongation 4/17/2017     Traumatic rhabdomyolysis (HC) 04/17/2017    hosp'd 7 days at [facility]      Vasculitis (HC) 1/2014    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      Past Surgical History:      Procedure  Laterality Date     BUNIONECTOMY Left 5/2006    w/claw toes, later fractures       COLECTOMY Right 3/1/15    Right  partial colectomy and ileum; Dr Hayden       COLONOSCOPY  2012    [place]--normal       ECTOPIC PREGNANCY SURGERY  Years ago    Twice       Hx closed reduction      R arm X2, childhood       Hx CTR Bilateral 1991,1993     Hx lipoma removal Left 1994    chest wall       Hx salpingectomy Bilateral 1981,1983    for ectopic pregnancies         Allergies     Allergen  Reactions     Erythromycin Rash     Sulfa (Sulfonamide Antibiotics) Rash     Prescriptions Prior to Admission     Medication  Sig     acetaminophen (TYLENOL EXTRA STRGTH) 500 mg tablet Take 1,000 mg by mouth every 8 hours. Max acetaminophen dose: 4000mg in 24 hrs.     ALPRAZolam (XANAX) 1 mg tablet Take 1 tablet by mouth 3 times daily if needed for Anxiety.     cholecalciferol (VITAMIN D) 1,000 unit capsule Take 1 capsule by mouth once daily. (Patient taking differently: Take 1,000 Units by mouth at bedtime.)     desipramine 100 mg tablet TAKE ONE TABLET BY MOUTH EVERY DAY (Patient taking differently: Take 100 mg by mouth at bedtime.)     dextroamphetamine (DEXEDRINE) 5 mg tablet Take 2 tablets by mouth three times daily at 8 AM, noon, and 4 PM.     diphenoxylate-atropine, 2.5-0.025 mg, (LOMOTIL) 2.5-0.025 mg tablet TAKE 1 TABLET BY MOUTH 4 TIMES DAILY IF NEEDED FOR DIARRHEA.     gabapentin (NEURONTIN) 300 mg capsule Take 3 capsules by mouth 3 times daily.     hydroCHLOROthiazide (HCTZ) 25 mg tablet TAKE 12.5 TO 25 MG BY MOUTH EVERY DAY AS NEEDED     hydrOXYzine pamoate (VISTARIL) 25 mg capsule Take 1-2 capsules by mouth every 6 hours if needed.     L.ACID/L.CASEI/B.BIF/B.YUE/FOS (PROBIOTIC BLEND ORAL) Take 1 capsule by mouth once daily.     loperamide (IMODIUM) 2 mg capsule Take by mouth as needed for diarrhea. Take 4mg by mouth with 1st loose stool, then 2mg with each subsequent loose stool. Max 16 mg in 24 hrs.     magnesium oxide (MAG-) 400 mg tablet Take 1 tablet by mouth 2 times daily.     multivitamin (MVI) tablet Take 1 tablet by mouth once daily.  "    nadolol (CORGARD) 40 mg tablet Take 40 mg by mouth at bedtime.     nortriptyline 50 mg capsule Take 2 capsules by mouth at bedtime.     oxyCODONE 10 mg tablet 2 tablets tid and one tablet at hs     pantoprazole (PROTONIX) 40 mg delayed-release tablet Take 1 tablet by mouth once daily.     PROPYLENE GLYCOL OPHT Place 1-2 Drops into both ears 3 times daily if needed.     warfarin (COUMADIN) 2.5 mg tablet Take 2.5 mg x 5 days and 1.25 mg x 2 days/week        Social History     Social History Main Topics          Smoking status:   Former Smoker      Packs/day:  0.50      Start date:  1950      Quit date:  3/31/1975      Smokeless tobacco:   Never Used      Alcohol use   0.0 oz/week     0 Standard drinks or equivalent per week        Comment: 6/month       Drug use:   No      Sexual activity:   Not Currently      Social History Narrative    Single, no kids.  Lives alone in apartment in [place].  RN, currently on Social Security Disability         No family history on file.   Family Status     Relation  Status     Father  at age 85    CHF, DM2      Mother  at age 83    urosepsis, DM2      Brother  at age 65    DM2, renal failure/dialysis, AFib      Sister Alive    L&W      Sister Alive    L&W      Brother Alive    L&W        PHYSICAL EXAM:  BP 93/56  Pulse 79  Temp 98.9  F (37.2  C)  Resp 18  Ht 1.676 m (5' 6\")  Wt 65.3 kg (143 lb 15.4 oz)  SpO2 98%  Breastfeeding? Unknown  BMI 23.24 kg/m2  Vitals:        17 0202 17 0553 17 0909 17 1100   BP:  92/50 (!) 80/40 93/56   Pulse:  80 88 79   Resp:  16 16 18   Temp:  99.3  F (37.4  C) 97.8  F (36.6  C) 98.9  F (37.2  C)   SpO2:  93% 94% 98%   Weight: 65.3 kg (143 lb 15.4 oz)      Height:           Intake/Output Summary (Last 24 hours) at 17 1447  Last data filed at 17 1404   Gross per 24 hour   Intake             2300 ml   Output             1700 ml   Net              600 ml     EXAM:  General " Appearance: comfortable watching tv and talking with aide  Head Exam: atraumatic; no tenderness with palp  Eye Exam: no induration or icterus  Nose Exam: no evidence of bleeding from the passages  OroPharynx Exam: moist; no evidence of bleeding  Chest/Respiratory Exam: no deformity; CTA bilaterally; no crackles or wheezes or rhonchi  Cardiovascular Exam: reg rate and rhythm; normal S1 and S2, no S3 or S4, no murmur or rub; no peripheral edema of U/E's or L/E's  Gastrointestinal Exam: flat; normal bs; soft and no tenderness or masses with palp of all 4 quadrants  Musculoskeletal Exam: no deformity of U/E's or L/E's; no tenderness with palp of U/E's or L/E's  Skin Exam: pink warm and dry; no new bruises or abrasions; no hematomas  Neurologic Exam: alert; fully oriented; acting her usual self    Laboratory:  Recent Labs        06/01/17 1910 05/31/17   1445   WBC  8.3   --    HGB  14.4   --    HCT  42.7   --    MCV  88   --    PLT  311   --    INR  8.4 HH  8.8 HH       Recent Labs       06/01/17 1910   SODIUM  132 L   POTASSIUM  4.1   CHLORIDE  100   [id number]  22   BUN  24   CREATININE  0.72   GLUCOSE  133 H   CALCIUM  9.5       Recent Labs       06/01/17 1910   ALBUMIN  4.0   BILITOTAL  0.5   ALT  30   AST  58 H   PROTEIN  7.1     Additional Comments:  I reviewed the patient's new clinical lab test results.  UA: 6-10 rbc's, no WBC's, many bacteria  Urine DOA reviewed  ASA level <0.2  Tylenol level <0.2  ETOH <0.010      I reviewed the patient s new imaging test results with Dr Malhotra, Rad:   1) CT Head Without Intravenous Contrast: FINDINGS:  Normal appearing brain parenchyma without intraparenchymal hemorrhage and normal gray-white matter differentiation/no obvious acute ischemic stroke. No   intra-or extra-axial fluid collection, no supra-or infratentorial mass, no mass effect or midline shift.  Ventricles, sulci and basal cisterns are normal in size without hydrocephalus. Skull bones are normal.  No  significant mucoperiosteal   thickening in the visualized paranasal sinuses. No evidence of mastoid effusion.   IMPRESSION: No evidence of an acute intracranial hemorrhage, mass lesion or obvious acute   ischemic infarction.    2) XR KNEE 2 VIEWS LEFT: FINDINGS:  No fracture or dislocation is identified. There is minimal narrowing of the lateral femoral tibial joint compartment and mild narrowing of the medial femoral tibial joint compartment. There are small accompanying osteophytes. There is increased density seen on the lateral view projecting over the suprapatellar bursa region compatible with joint effusion. No foreign body is seen. IMPRESSION: No acute fracture.  There has been progression of the osteoarthritis compared to the 03/30/2016 exam.    3) XR HIP 2 OR 3 VIEWS W PELVIS LEFT: Findings: The hip joint spaces are normally maintained. There is no evidence for fracture or destructive lesion. No radiopaque soft tissue foreign body is seen.  Impression: No evidence for fracture.    I discussed the patient's condition with Dr. Lawson, On Call MD      ASSESSMENT/PLAN:  Patient Active Hospital Problem List:  Fall at home, 6/1/2017, acute    Assessment: no evidence so far of injury     Elevated INR, acute    Assessment: init INR 8.4; s/p Vit K 2.5 mg po times one    Pyuria, acute    Assessment: urine cx added this morning; afebrile; normal WBC count    Chronic pain syndrome, chronic  Pain medication agreement signed 04/04/2017, chronic    Assessment: no recent change    Hx of DVT (deep venous thrombosis), bilateral    Assessment: chronic anticoagulation    Chronic diarrhea    Assessment: recently well controlled with meds    PLAN: cont obs serv med/surg; cont routine vitals; cont reg diet; cont PT and OT assessments; social serv consult because pt would like to return to the [place] at [facility]; recheck INR in the morning; monitor urine cx; cont usual meds without any adjustment per d/w pt.  She does not want  anything reduced but is open to the idea of talking about with Dr Cottrell next week.    Telly Solorzano MD

## 2017-12-29 NOTE — ED NOTES
Patient Information     Patient Name MRN Sandie Fuentes [id number] Female 1955      ED Nursing Note by Spring Bolton RN at 2017 11:05 PM     Author:  Spring Bolton RN Service:  (none) Author Type:  NURS- Registered Nurse     Filed:  2017 11:06 PM Date of Service:  2017 11:05 PM Status:  Signed     :  Spring Bolton RN (NURS- Registered Nurse)            Patient eating crackers/toast.  No new complaints.

## 2017-12-29 NOTE — ED NOTES
Patient Information     Patient Name MRN Sex Sandie Kearns [id number] Female 1955      ED Nursing Note by Spring Bolton RN at 2017  7:25 PM     Author:  Spring Bolton RN Service:  (none) Author Type:  NURS- Registered Nurse     Filed:  2017  7:26 PM Date of Service:  2017  7:25 PM Status:  Signed     :  Spring Bolton RN (NURS- Registered Nurse)            Right foot has bandage on placed by patient.  Pt states she has a claw toe, second metatarsal which puts pressure on the medial aspect of the great toe so patient bandages it herself with a spacer.

## 2017-12-29 NOTE — PATIENT INSTRUCTIONS
Patient Information     Patient Name MRN Sandie Fuentes [id number] Female 1955      Patient Instructions by Pilar Luis RN at 2017  3:00 PM     Author:  Pilar Luis RN  Service:  (none) Author Type:  NURS- Registered Nurse     Filed:  2017  3:12 PM  Encounter Date:  2017 Status:  Addendum     :  Pilar Luis RN (NURS- Registered Nurse)        Related Notes: Original Note by Pilar Luis RN (NURS- Registered Nurse) filed at 2017  3:10 PM            2017 Details    Sun Mon Tue Wed Thu Fri Sat         1               2               3                 4               5               6               7               8               9               10                 11               12      2.5 mg   See details      13      2.5 mg         14      2.5 mg         15      2.5 mg         16      1.25 mg         17      2.5 mg           18      2.5 mg         19      2.5 mg         20      2.5 mg         21               22               23               24                 25               26               27               28               29               30                 Date Details    This INR check       Date of next INR:  2017         How to take your warfarin dose     To take:  1.25 mg Take half of a 2.5 mg tablet.    To take:  2.5 mg Take one of the 2.5 mgtablets.             Description          Take 2.5 mg x six days/week and 1.25 mg x one day/week.  Recheck INR in one week, 17.  PILAR LUIS RN ....................  2017   3:08 PM              Anticoagulation Summary as of 2017     INR goal 2.0-3.0    Today's INR 2.1    Next INR check 2017          Call your Anticoagulation Clinic at Dept: [phone]   if:   1. Any medications are started, stopped, or there is a change in dose.  2. You experience any bleeding that is not easily stopped or if it is recurrent.  3. You notice an increase in bruising or any  bruising that does not heal.  4. You are scheduled for surgery, colonoscopy, dental extraction or any other procedure where you may need to stop your Coumadin (warfarin).

## 2017-12-29 NOTE — ED PROVIDER NOTES
Patient Information     Patient Name MRN Sex Sandie Kearns [id number] Female 1955      ED Provider Note by Fernando Hopper MD at 2017  9:31 PM     Author:  Fernando Hopper MD Service:  (none) Author Type:  Physician     Filed:  2017  9:54 PM Date of Service:  2017  9:31 PM Status:  Signed     :  Fernando Hopper MD (Physician)            PATIENT:  Sandie Stuart       62 y.o.       female       MRN #:  [id number]    CHIEF COMPLAINT:  Fall    62-year-old female comes in the emergency department via EMS after a fall. Circumstances of the fall aren't really clear. She doesn't exactly remember what happened. She sleeps in a very high bed made in the 0s and neck Ladd she knew she will cup several hours later on the floor next to the bed. She tried to get up at that point and could not bear weight on her left leg and thus she called 911 to be brought into the emergency department to be evaluated. She thinks she fell approximately at 0430 this morning and did not call 911 until 1400.  She has chronic left lower extremity pain and it was presented by the EMS personnel that this was her chronic pain. And that was the initial assumption however later in the visit when I went in to clarify the history she states that the pain that she had after the fall was a little bit different than the chronic pain that she has. Also she has an elevated INR and that was known previously and she was told to hold her Coumadin for couple of days. She is only held 1 dose thus far. She's had no known bleeding. She has a mild headache now after the fall but nothing severe.     HPI     ALLERGIES:  Erythromycin and Sulfa (sulfonamide antibiotics)    CURRENT MEDICATIONS:      acetaminophen (TYLENOL EXTRA STRENGTH) 500 mg tablet   ALPRAZolam (XANAX) 1 mg tablet   cholecalciferol (VITAMIN D) 1,000 unit capsule   cholestyramine-sucrose 4 G per scoop (QUESTRAN) 4 gram powder   desipramine 100 mg tablet    dextroamphetamine (DEXEDRINE) 5 mg tablet   diphenoxylate-atropine, 2.5-0.025 mg, (LOMOTIL) 2.5-0.025 mg tablet   gabapentin (NEURONTIN) 300 mg capsule   hydroCHLOROthiazide (HCTZ) 25 mg tablet   hydrOXYzine pamoate (VISTARIL) 25 mg capsule   loperamide (IMODIUM) 2 mg capsule   magnesium oxide (MAG-) 400 mg tablet   multivitamin (MVI) tablet   nadolol (CORGARD) 40 mg tablet   nortriptyline 50 mg capsule   oxyCODONE 10 mg tablet   pantoprazole (PROTONIX) 40 mg delayed-release tablet   POLYVINYL ALCOHOL (NATURAL TEARS OPHT)   PROBIOTIC 10 billion cell cap   warfarin (COUMADIN) 2.5 mg tablet     PROBLEM LIST:       Traumatic rhabdomyolysis (HC)      Fall at home      Right hip pain      QT prolongation      Venous stasis LLE      Venous stasis ulcer of left lower extremity (HC)      Bilateral foot-drop      Dyspeptic diarrhea      Peripheral edema      Chronic pain syndrome      History of rhabdomyolysis      Anticoagulation monitoring, special range [V58.61]      DVT (deep venous thrombosis), bilateral      Chronic diarrhea      History of iron deficiency anemia      Major depressive episode      Vitamin D deficiency      Vasculitis (HC) microangiopathic-diagnosed at [facility]      Pain medication agreement signed 04/04/2017      GERD        PAST MEDICAL HISTORY:    Past Medical History:     Diagnosis  Date     Anxiety disorder      Bilateral foot-drop 2016     C. difficile colitis 3/7/2015     C. difficile colitis 5/27/2015     Chronic diarrhea 9/17/2015     Chronic pain      DVT (deep venous thrombosis) (HC) 12/2011     Fingers fractured 07/19/07     GERD (gastroesophageal reflux disease)      History of rhabdomyolysis 12/2015, 3/2016     Hx of ectopic pregnancy      Leg ulcer, left (HC) 06/2010     Lower extremity venous stasis      Major depressive episode      Mood disorder (HC)      Vasculitis (HC) 1/2014     PAST SURGICAL HISTORY:    Past Surgical History:      Procedure  Laterality Date      "Bunionectomy Left 5/2006     Colectomy Right 3/1/15     Colonoscopy  2012     Ectopic pregnancy surgery  Years ago     Hx closed reduction       Hx ctr Bilateral 1991,1993     Hx lipoma removal Left 1994     Hx salpingectomy Bilateral 1981,1983     FAMILY HISTORY:  No family history on file.  SOCIAL HISTORY:  Marital Status:  Single [1]  Employment Status:  Not Employed [3]   Occupation:  DISABLED  Substance Use:  Smoking status: Former Smoker                                                              Packs/day: 0.50      Years: 0.00         Start date: 1/1/1950     Quit date: 3/31/1975  Smokeless status: Never Used                      Alcohol use: Yes           0.0 oz/week     0 Standard drinks or equivalent per week     Comment: 6/month       Review of Systems   Constitutional: Negative for chills and fever.   HENT: Negative for congestion.    Eyes: Negative for photophobia.   Respiratory: Negative for apnea and chest tightness.    Cardiovascular: Negative for chest pain.   Gastrointestinal: Negative for abdominal distention and abdominal pain.   Endocrine: Negative for polydipsia, polyphagia and polyuria.   Genitourinary: Negative for decreased urine volume, difficulty urinating, dysuria, vaginal bleeding and vaginal discharge.   Musculoskeletal: Negative for arthralgias and back pain.   Neurological: Negative for dizziness.   Hematological: Negative for adenopathy.   Psychiatric/Behavioral: Negative for agitation and confusion.        Patient Vitals for the past 24 hrs:   BP Temp Pulse Resp SpO2 Height Weight   06/01/17 2003 115/78 - 80 18 99 % - -   06/01/17 1840 120/94 98.6  F (37  C) 84 18 98 % 1.68 m (5' 6.14\") 65.3 kg (143 lb 15.4 oz)      Physical Exam   Constitutional: She is oriented to person, place, and time. She appears well-developed and well-nourished.   HENT:   Mouth/Throat: No oropharyngeal exudate.   Eyes: Conjunctivae and EOM are normal. Pupils are equal, round, and reactive to light.   Neck: " Normal range of motion. Neck supple.   Cardiovascular: Normal rate.    No murmur heard.  Pulmonary/Chest: Breath sounds normal. No respiratory distress. She has no wheezes.   Abdominal: Soft. Bowel sounds are normal. She exhibits no distension. There is no tenderness.   Musculoskeletal: She exhibits tenderness. She exhibits no edema.   Neurological: She is alert and oriented to person, place, and time. She has normal reflexes.   Nursing note and vitals reviewed.   IMAGING:                CT HEAD BRAIN WO (Final result) Result time: 06/01/17 20:20:38     Final result by [employer], [employer] (06/01/17 20:20:38)      Narrative:      EXAM:    CT Head Without Intravenous Contrast    CLINICAL HISTORY:    62 years old, female; Injury or trauma; Fall; Initial encounter; Bleeding /   hemorrhage    TECHNIQUE:    Axial computed tomography images of the head/brain without intravenous   contrast.  This CT exam was performed using one or more of the following dose   reduction techniques:  automated exposure control, adjustment of the mA and/or   kV according to patient size, and/or use of iterative reconstruction technique.    Coronal and sagittal reformatted images were created and reviewed.    COMPARISON:    CT HEAD BRAIN WO 12/17/2015 10:14:09 PM    FINDINGS:    Normal appearing brain parenchyma without intraparenchymal hemorrhage and   normal gray-white matter differentiation/no obvious acute ischemic stroke. No   intra-or extra-axial fluid collection, no supra-or infratentorial mass, no mass   effect or midline shift.       Ventricles, sulci and basal cisterns are normal in size without   hydrocephalus. Skull bones are normal.  No significant mucoperiosteal   thickening in the visualized paranasal sinuses. No evidence of mastoid   effusion.     IMPRESSION:       No evidence of an acute intracranial hemorrhage, mass lesion or obvious acute   ischemic infarction.      THIS DOCUMENT HAS BEEN ELECTRONICALLY SIGNED BY [facility]  ROBERTA KRUGER        Hip and knee x-rays reviewed I see no apparent fracture. Pending radiology over read.  LABORATORY:  Results for orders placed or performed during the hospital encounter of 06/01/17       Comprehensive Metabolic Panel       Result  Value Ref Range Status    SODIUM 132 (L) 133 - 143 mmol/L Final    POTASSIUM 4.1 3.5 - 5.1 mmol/L Final    CHLORIDE 100 98 - 107 mmol/L Final    CO2,TOTAL 22 21 - 31 mmol/L Final    ANION GAP 10 5 - 18                 Final    GLUCOSE 133 (H) 70 - 105 mg/dL Final    CALCIUM 9.5 8.6 - 10.3 mg/dL Final    BUN 24 7 - 25 mg/dL Final    CREATININE 0.72 0.70 - 1.30 mg/dL Final    BUN/CREAT RATIO           33                 Final    GFR if African American >60 >60 ml/min/1.73m2 Final    GFR if not African American >60 >60 ml/min/1.73m2 Final    ALBUMIN 4.0 3.5 - 5.7 g/dL Final    PROTEIN,TOTAL 7.1 6.4 - 8.9 g/dL Final    GLOBULIN                  3.1 2.0 - 3.7 g/dL Final    A/G RATIO 1.3 1.0 - 2.0                 Final    BILIRUBIN,TOTAL 0.5 0.3 - 1.0 mg/dL Final    ALK PHOSPHATASE 104 34 - 104 IU/L Final    ALT (SGPT) 30 7 - 52 IU/L Final    AST (SGOT) 58 (H) 13 - 39 IU/L Final   Protime-INR       Result  Value Ref Range Status    INR 8.4 (HH) <1.3 Final    PROTIME 96.9 (H) 11.9 - 15.2 sec Final   Acetaminophen       Result  Value Ref Range Status    ACETAMINOPHEN <0.2 (L) 10.0 - 30.0 ug/mL Final   Ethanol Serum or Plasma       Result  Value Ref Range Status    ETHANOL                   <0.010 <0.010 g/dL Final   Salicylate       Result  Value Ref Range Status    SALICYLATE <0.2 (L) 15.0 - 30.0 mg/dL Final   Urinalysis with Reflex Microscopic if Positive       Result  Value Ref Range Status    COLOR                     Yellow Yellow Color Final    CLARITY                   Clear Clear Clarity Final    SPECIFIC GRAVITY,URINE    1.020 1.010, 1.015, 1.020, 1.025                 Final    PH,URINE                  5.0 (A) 6.0, 7.0, 8.0, 5.5, 6.5, 7.5, 8.5                 Final     UROBILINOGEN,QUALITATIVE  Normal Normal EU/dl Final    PROTEIN, URINE Negative Negative mg/dL Final    GLUCOSE, URINE Negative Negative mg/dL Final    KETONES,URINE             15 (A) Negative mg/dL Final    BILIRUBIN,URINE           Negative Negative                 Final    OCCULT BLOOD,URINE        Large (A) Negative                 Final    NITRITE                   Positive (A) Negative                 Final    LEUKOCYTE ESTERASE        Negative Negative                 Final   Drug Abuse Screen Rapid Urine In House ([facility])       Result  Value Ref Range Status    THC METABOLITES,QUAL      Not Detected Not Detected Final    PCP,QUAL                  Not Detected Not Detected Final    COCAINE,QUAL              Not Detected Not Detected Final    METHAMPHETAMINE, QUALITATIVE Not Detected Not Detected Final    OPIATES,QUAL              Presumptive Positive-Unconfirmed Result (A) Not Detected Final    AMPHETAMINE, QUALITATIVE Presumptive Positive-Unconfirmed Result (A) Not Detected Final    BENZODIAZEPINES,QUAL      Presumptive Positive-Unconfirmed Result (A) Not Detected Final    TRICYCLICS,QUAL           Presumptive Positive-Unconfirmed Result (A) Not Detected Final    METHADONE, QUALITATIVE Not Detected Not Detected Final    BARBITURATES,QUAL         Not Detected Not Detected Final    OXYCODONE, QUALITATIVE Presumptive Positive-Unconfirmed Result (A) Not Detected Final    BUPRENORPHINE, QUALITATIVE Not Detected Not Detected Final    PROPOXYPHENE,QUAL Not Detected Not Detected Final   CBC WITH AUTO DIFFERENTIAL       Result  Value Ref Range Status    WHITE BLOOD COUNT         8.3 4.5 - 11.0 thou/cu mm Final    RED BLOOD COUNT           4.84 4.00 - 5.20 mil/cu mm Final    HEMOGLOBIN                14.4 12.0 - 16.0 g/dL Final    HEMATOCRIT                42.7 33.0 - 51.0 % Final    MCV                       88 80 - 100 fL Final    MCH                       29.8 26.0 - 34.0 pg Final    MCHC                      33.7  32.0 - 36.0 g/dL Final    RDW                       14.1 11.5 - 15.5 % Final    PLATELET COUNT            311 140 - 440 thou/cu mm Final    MPV                       9.4 6.5 - 11.0 fL Final    NEUTROPHILS               75.2 (H) 42.0 - 72.0 % Final    LYMPHOCYTES               15.5 (L) 20.0 - 44.0 % Final    MONOCYTES                 8.0 <12.0 % Final    EOSINOPHILS               0.6 <8.0 % Final    BASOPHILS                 0.6 <3.0 % Final    ABSOLUTE NEUTROPHILS      6.2 1.7 - 7.0 thou/cu mm Final    ABSOLUTE LYMPHOCYTES      1.3 0.9 - 2.9 thou/cu mm Final    ABSOLUTE MONOCYTES        0.7 <0.9 thou/cu mm Final    ABSOLUTE EOSINOPHILS      0.1 <0.5 thou/cu mm Final    ABSOLUTE BASOPHILS        0.1 <0.3 thou/cu mm Final   URINALYSIS MICROSCOPIC       Result  Value Ref Range Status    RBC 6-10 (A) 0-2, None Seen /HPF Final    WBC None Seen 0-2, 3-5, None Seen /HPF Final    BACTERIA                  Many (A) None Seen, Rare, Occasional, Few Bacteria/HPF Final    EPITHELIAL CELLS          Few None Seen, Few Epi/HPF Final       ED COURSE:  ED Course       Orders Placed This Encounter      CT HEAD BRAIN WO      XR KNEE 2 VIEWS LEFT      XR HIP 2 OR 3 VIEWS W PELVIS LEFT      CBC and Differential       Comprehensive Metabolic Panel      Protime-INR      Acetaminophen      Ethanol Serum or Plasma      Salicylate      Urinalysis with Reflex Microscopic if Positive      Drug Abuse Screen Rapid Urine In House ([facility])      CBC WITH AUTO DIFFERENTIAL      URINALYSIS MICROSCOPIC      NaCl 0.9% ...    ENCOUNTER DIAGNOSES:  ENCOUNTER DIAGNOSES        ICD-10-CM    1. Fall at home, initial encounter W19.XXXA      Y92.099    2. Elevated INR R79.1    3. Leg pain, left M79.605      discussed with Dr. Lawson. We will obs here. The patient would like to stay at [facility]. Indications for observation include fall with unknown circumstances with elevated INR. Initial head CT is negative. If she has a neurologic change consider repeating  a head CT. Also initial x-rays of her left extremity are negative. If she continues to have difficulty with bearing weight might need to consider a CT and/or MRI to evaluate for occult trauma or another consideration would be osteomyelitis given her chronic ulcerative or wounds in that leg. We'll give her a single dose of oral vitamin K here in the emergency department due to her elevated INR. obs orders will be completed by myself. Dr. Lawson will assume care by phone after that.  MDM  Reviewed: previous chart, nursing note and vitals  Interpretation: labs and x-ray        Fernando Hopper MD  DOS: 6/1/2017   [facility]

## 2017-12-29 NOTE — ED NOTES
Patient Information     Patient Name MRN Sex Sandie Kearns [id number] Female 1955      ED Nursing Note by Spring Bolton RN at 2017  7:59 PM     Author:  Spring Bolton RN Service:  (none) Author Type:  NURS- Registered Nurse     Filed:  2017  7:59 PM Date of Service:  2017  7:59 PM Status:  Signed     :  Spring Bolton RN (NURS- Registered Nurse)            Pt back from CT.

## 2017-12-29 NOTE — ED NOTES
Patient Information     Patient Name MRN Sandie Fuentes [id number] Female 1955      ED Nursing Note by Spring Bolton RN at 2017 11:14 PM     Author:  Spring Bolton RN Service:  (none) Author Type:  NURS- Registered Nurse     Filed:  2017 11:14 PM Date of Service:  2017 11:14 PM Status:  Signed     :  Spring Bolton RN (NURS- Registered Nurse)            Nurse to nurse completed with HOLLY Becerra

## 2017-12-29 NOTE — PATIENT INSTRUCTIONS
Patient Information     Patient Name MRN Sandie Fuentes [id number] Female 1955      Patient Instructions by Avelina Bourne RN at 2017 10:53 AM     Author:  Avelina Bourne RN Service:  (none) Author Type:  NURS- Registered Nurse     Filed:  2017 11:00 AM Encounter Date:  2017 Status:  Signed     :  Avelina Bourne RN (NURS- Registered Nurse)            2017 Details    Sun  Thu Fri Sat         1               2               3                 4               5               6               7               8               9               10                 11               12               13               14               15               16               17                 18               19               20      Hold   See details      21      Hold         22      2.5 mg         23               24                 25               26               27               28               29               30                 Date Details    This INR check       Date of next INR:  2017         How to take your warfarin dose     To take:  2.5 mg Take one of the 2.5 mg tablets.    Hold Do not take your warfarin dose. The details table to the right may include additional information.                  Description          Hold x 2 days and recheck on 17. Avelina Bourne RN    2017  10:59 AM                Anticoagulation Summary as of 2017     INR goal 2.0-3.0    Today's INR 5.8!    Next INR check 2017          Call your Anticoagulation Clinic at Dept: [phone]   if:   1. Any medications are started, stopped, or there is a change in dose.  2. You experience any bleeding that is not easily stopped or if it is recurrent.  3. You notice an increase in bruising or any bruising that does not heal.  4. You are scheduled for surgery, colonoscopy, dental extraction or any other procedure where you may need to stop your Coumadin  (warfarin).  Patient not here, Protime Communication sheet with screening questions and current INR received via FAX from outside agency. Results reviewed, Warfarin dosing per protocol, and recommended follow-up appointment made. Paperwork FAXED back to facility.

## 2017-12-29 NOTE — ED NOTES
Patient Information     Patient Name MRN Sex Sandie Kearns [id number] Female 1955      ED Nursing Note by Spring Bolton RN at 2017  8:59 PM     Author:  Spring Bolton RN Service:  (none) Author Type:  NURS- Registered Nurse     Filed:  2017  9:31 PM Date of Service:  2017  8:59 PM Status:  Signed     :  Spring Bolton RN (NURS- Registered Nurse)            Patient resting with eyes closed.

## 2017-12-30 NOTE — NURSING NOTE
Patient Information     Patient Name MRN Sandie Fuentes [id number] Female 1955      Nursing Note by Ruby Gupta at 2017  2:00 PM     Author:  Ruby Gupta Service:  (none) Author Type:  (none)     Filed:  2017  2:03 PM Encounter Date:  2017 Status:  Signed     :  Ruby Gupta            Patient presents to the clinic today for a follow up on depression.    Ruby Gupta ....................  2017   2:00 PM

## 2017-12-30 NOTE — DISCHARGE SUMMARY
Patient Information     Patient Name MRN Sandie Fuentes [id number] Female 1955      Discharge Summaries by Telly Solorzano MD at 6/3/2017  9:52 AM     Author:  Telly Solorzano MD Service:  (none) Author Type:  Physician     Filed:  6/3/2017 10:05 AM Date of Service:  6/3/2017  9:52 AM Status:  Signed     :  Telly Solorzano MD (Physician)            HOSPITAL DISCHARGE SUMMARY    Patient Name: Sandie Stuart  YOB: 1955  Age: 62 y.o.  Medical Record Number: [id number]  Primary Physician: David Cottrell MD  Phone: [phone]  Admission Date: 2017  Discharge Date/Date of Service: 6/3/2017    She will be discharged from [facility] and Hospital to home.    DISCHARGE DIAGNOSIS:  Fall at home, 2017, acute    Assessment: no evidence so far of injury     Elevated INR, acute    Assessment: init INR 8.4; s/p Vit K 2.5 mg po times one    Elevated CK Total, acute    Assessment: mild     Pyuria, acute    Assessment: urine cx added this morning; afebrile; normal WBC count    Chronic pain syndrome, chronic  Pain L/E pain; medication agreement signed 2017, chronic    Assessment: no recent change    Hx of DVT (deep venous thrombosis), bilateral    Assessment: chronic anticoagulation    Chronic diarrhea    Assessment: recently well controlled with meds    BRIEF HOSPITAL COURSE:  Admit HPI:  Pt is a 62 y.o. female who was working in her apartment cleaning and getting ready to moves some things around her apartment with Dr Leigh.  She worked on her apartment until 4:30 in the morning on 2017.  She took her usual oxycodone 20 mg and her usual xanax 1 mg.  She went to bed.  She woke up several hours later sleeping on the floor.  She does not recall falling.  She has chronic left leg pain which is about the same.  She has not recently changed her medications and has been using the same amount and using them in the same pattern for several months.  She denies taking any  "extra doses.  She was just discharged from [facility] last week after her week long hospital stay in mid-April.  She has been eating and drinking her usual amounts.  Denies any other drug use or alcohol use.  She saw Dr Hayden on Wednesday, 5/31/2017, and her left leg was completely healed again.  An unna boot was put on again to allow more time for her skin to heal.    She was evaluated in the ER.  Xray of her pelvis and left hip and left knee showed no fracture.  Her INR was elevated, see above for details.  Her CK total was mildly elevated.  She received a little over a liter of ivf's but was able to drink over 3.1 liters during her stay.    She was monitored yesterday and today.  Because of mildly low bp her nadolol was held last night.  After discussion with the patient will decrease from 40 mg to 20 mg at bedtime.  Will have Dr Cottrell review her bp's done 2-3 times a day at [facility] between now and her f/u appt.  No other medication adjusts were done but I recommended tapering of her oxycodone and xanax soon.  She wants to discuss this with her PCP, Dr Cottrell first.    DISCHARGE EXAM:   General Appearance: comfortable; does not appear ill  Head Exam: atraumatic; no tenderness with palp  Chest/Respiratory Exam: CTA bilaterally; no crackles or wheezes or rhonchi  Cardiovascular Exam: reg rate and rhythm; normal S1 and S2  Musculoskeletal Exam: no tenderness with palp of U/E's or L/E's    PROCEDURES PERFORMED DURING HOSPITALIZATION: None    COMPLICATIONS IN HOSPITAL: None    PERTINENT FINDINGS/RESULTS AT DISCHARGE:   /44  Pulse 91  Temp 98.5  F (36.9  C)  Resp 20  Ht 1.676 m (5' 6\")  Wt 69.6 kg (153 lb 7 oz)  SpO2 96%  Breastfeeding? Unknown  BMI 24.77 kg/m2   Vitals:        06/02/17 1955 06/02/17 2118 06/03/17 0225 06/03/17 0542   BP: 96/56 (!) 88/44 102/44    Pulse: 88 80 91    Resp: 20  20    Temp: 98  F (36.7  C)  98.5  F (36.9  C)    SpO2: 97%  96%    Weight:    69.6 kg (153 " lb 7 oz)   Height:         Patient Vitals for the past 72 hrs:   Weight   06/03/17 0542 69.6 kg (153 lb 7 oz)   06/02/17 0202 65.3 kg (143 lb 15.4 oz)   06/01/17 2330 65.3 kg (143 lb 15.4 oz)   06/01/17 1840 65.3 kg (143 lb 15.4 oz)       Latest Laboratory Results:  CBC: Recent Labs       06/01/17 1910   WBC  8.3   HGB  14.4   HCT  42.7   MCV  88   PLT  311     INR:   Recent Labs         06/03/17   0540  06/01/17   1910 05/31/17   1445   INR  1.3 H  8.4 HH  8.8 HH    Recent Labs       06/01/17 1910   SODIUM  132 L   POTASSIUM  4.1   CHLORIDE  100   [id number]  22   BUN  24   CREATININE  0.72   GLUCOSE  133 H   CALCIUM  9.5     Recent Labs       06/01/17 1910   ALBUMIN  4.0   BILITOTAL  0.5   ALT  30   AST  58 H   PROTEIN  7.1     IMPORTANT PENDING TEST RESULTS: 6/2/2017 Final Urine culture result, negative at the time of discharge.    CONDITION AT DISCHARGE:    Improving    DISCHARGE ORDERS     Your Home Medicines      CHANGE how you take these medicines       Instructions    desipramine 100 mg tablet   For diagnoses:  Major depressive episode   What changed:  See the new instructions.    Take 1 tablet by mouth at bedtime.   Doctor's comments:  **Patient requests 90 days supply**       diphenoxylate-atropine (2.5-0.025 mg) 2.5-0.025 mg tablet   For diagnoses:  Dyspeptic diarrhea, Chronic diarrhea   What changed:  See the new instructions.   Commonly known as:  LOMOTIL    Take 1 tablet by mouth 4 times daily if needed for Diarrhea.       nadolol 40 mg tablet   For diagnoses:  Tachycardia, paroxysmal (HC)   What changed:  how much to take   Commonly known as:  CORGARD    Take 0.5 tablets by mouth at bedtime.       warfarin 2.5 mg tablet   For diagnoses:  Deep vein thrombosis (DVT) of proximal vein of both lower extremities, unspecified chronicity (HC), Anticoagulation monitoring, special range   What changed:    - how much to take  - how to take this  - when to take this  - additional instructions   Commonly  known as:  COUMADIN    Take 0.5 tablets by mouth once daily. [facility]         CONTINUE taking these medicines       Instructions    acetaminophen 500 mg tablet   Commonly known as:  TYLENOL EXTRA STRGTH    Take 1,000 mg by mouth every 8 hours. Max acetaminophen dose: 4000mg in 24 hrs.       ALPRAZolam 1 mg tablet   For diagnoses:  Anxiety   Commonly known as:  XANAX    Take 1 tablet by mouth 3 times daily if needed for Anxiety.       dextroamphetamine 5 mg tablet   For diagnoses:  Major depressive episode   Commonly known as:  DEXEDRINE    Take 2 tablets by mouth three times daily at 8 AM, noon, and 4 PM.       gabapentin 300 mg capsule   For diagnoses:  Bilateral foot-drop, Vasculitis (HC)   Commonly known as:  NEURONTIN    Take 3 capsules by mouth 3 times daily.       hydrOXYzine pamoate 25 mg capsule   For diagnoses:  Vasculitis (HC)   Commonly known as:  VISTARIL    Take 1-2 capsules by mouth every 6 hours if needed.       loperamide 2 mg capsule   Commonly known as:  IMODIUM    Take by mouth as needed for diarrhea. Take 4mg by mouth with 1st loose stool, then 2mg with each subsequent loose stool. Max 16 mg in 24 hrs.       magnesium oxide 400 mg tablet   For diagnoses:  Hypotension, unspecified hypotension type   Commonly known as:  MAG-    Take 1 tablet by mouth 2 times daily.       multivitamin tablet   Commonly known as:  MVI    Take 1 tablet by mouth once daily.       nortriptyline 50 mg capsule   For diagnoses:  Major depressive episode    Take 2 capsules by mouth at bedtime.       oxyCODONE 10 mg tablet   For diagnoses:  Vasculitis (HC)    2 tablets tid and one tablet at hs       pantoprazole 40 mg delayed-release tablet   For diagnoses:  Gastroesophageal reflux disease without esophagitis   Commonly known as:  PROTONIX    Take 1 tablet by mouth once daily.       PROBIOTIC BLEND ORAL    Take 1 capsule by mouth once daily.       PROPYLENE GLYCOL OPHT    Place 1-2 Drops  into both ears 3 times daily if needed.         STOP taking these medicines          cholecalciferol 1,000 unit capsule   Commonly known as:  Vitamin D       hydroCHLOROthiazide 25 mg tablet   Commonly known as:  HCTZ            Where to get your medicines      You have received printed prescription(s) for these medicines or supplies. Take these to your preferred pharmacy.     Bring a paper prescription for each of these medications      ALPRAZolam 1 mg tablet     dextroamphetamine 5 mg tablet     diphenoxylate-atropine (2.5-0.025 mg) 2.5-0.025 mg tablet     oxyCODONE 10 mg tablet         Prescriptions for these medicines or supplies were NOT printed nor sent to your preferred pharmacy. Check with your doctor if you have questions.     ! Check with your doctor if you have questions.      desipramine 100 mg tablet     nadolol 40 mg tablet     warfarin 2.5 mg tablet           After Discharge Orders and Instructions     Activity as tolerated:        - The skilled nursing facility staff will re-evaluate you and may change your activity level.  - To be advanced according to nursing facility rehabilitation recommendations         Admission H & P Valid: Yes           Agency Standing Orders: Yes           All orders good for 45 days unelss otherwise indicated.            Allergies        -- Erythromycin -- Rash   -- Sulfa (Sulfonamide Antibiotics) -- Rash       Diet 2 Gram Sodium Restricted       .       Discharge Condition: Improving           Discharge Potential: Length of Stay Less Than 30 Days           Discharge Summary: Enclosed           Follow up appointment(s):       1) Lab Order for Tuesday, 6/6/2017: INR (for [facility]) and CK total    2) Dr Cottrell on Monday, 6/12/2017, at 2:00  - discuss tapering meds  - have bp and hr checked a couple times since decreasing your Nadolol    3) Dr Hayden on Monday, 6/12/2017, at 2.40.  - remove Unna Boot       Free of Communicable Disease: Yes           Give Two Step  Mantoux: Yes, unless current or contraindicated           Level of Care - Skilled           Patient Aware Of Diagnosis - Yes           Patient may leave SNF supervised with medications           Rehab Potential: Fair           Treatment Option:  Full Resuscitation           Treatment: Occupational Therapy Eval and Treat           Treatment: Physical Therapy Eval and Treat           Vital Signs per Facility           Weight per Facility           When should you be concerned?       For skilled nursing facility staff: Follow site specific protocol for calling physician or nurse practitioner.       Why you were at the hospital?       The reason(s) you were in the hospital was for monitoring after a possible fall at home.               DME: None    Total time spent on date of discharge for evaluation of patient/review of medications/review of final test results/review of pending test results/coordination of care/discharge plannin minutes    Physician(s) in addition to primary physician who should receive a copy:  Dr Hayden, Gen Surg    Telly Solorzano MD

## 2017-12-30 NOTE — NURSING NOTE
Patient Information     Patient Name MRN Sex Sandie Kearns [id number] Female 1955      Nursing Note by Elena Pritcehtt at 2017  1:50 PM     Author:  Elena Pritchett Service:  (none) Author Type:  (none)     Filed:  2017  9:36 AM Encounter Date:  2017 Status:  Signed     :  Elena Pritchett            A well fitting short leg plaster splint was applied to the extremity with the appropriate padding.  CMS is intact.  Cast care instructions were reviewed with the patient along with written instructions.  Elena Pritchett LPN .......2017 9:36 AM

## 2017-12-30 NOTE — NURSING NOTE
Patient Information     Patient Name MRN Sandie Fuentes [id number] Female 1955      Nursing Note by Jennifer Faith at 2017 11:00 AM     Author:  Jennifer Faith Service:  (none) Author Type:  (none)     Filed:  2017 11:27 AM Encounter Date:  2017 Status:  Signed     :  Jennifer Faith            Patient presents to clinic for medication management. Patient states that she is going to [place] for the winter and staying with her brother.  Jennifer FaithLPN ....................  2017   11:01 AM

## 2017-12-30 NOTE — NURSING NOTE
Patient Information     Patient Name MRN Sex Sandie Kearns [id number] Female 1955      Nursing Note by Payal Klein at 2017  3:40 PM     Author:  Payal Klein Service:  (none) Author Type:  (none)     Filed:  2017  1:16 PM Encounter Date:  2017 Status:  Signed     :  Payal Klein            Provider did not see patient needing to follow up with Dr. Sarah in [place].  [place] note states that she needs to follow up there with orthopedics.  Cast still on left leg.  Payal Klein LPN..........2017  1:16 PM

## 2017-12-30 NOTE — NURSING NOTE
Patient Information     Patient Name MRN Sex Sandie Kearns [id number] Female 1955      Nursing Note by Yani Champion LPN at 2017 10:20 AM     Author:  Yani Champion LPN Service:  (none) Author Type:  NURS- Licensed Practical Nurse     Filed:  2017 11:25 AM Encounter Date:  2017 Status:  Signed     :  Yani Champion LPN (NURS- Licensed Practical Nurse)            Sandie Stuart is a 62 y.o. female here today for wound care of the left leg. Declines mychart.  Yani CHRISTIAN. NAIN Champion.........2017   10:42 AM

## 2017-12-30 NOTE — ED TRIAGE NOTES
Patient Information     Patient Name MRN Sex Sandie Kearns [id number] Female 1955      ED Triage Notes by Kiersten Farfan RN at 2017  6:38 PM     Author:  Kiersten Farfan RN Service:  (none) Author Type:  NURS- Registered Nurse     Filed:  2017  6:40 PM Date of Service:  2017  6:38 PM Status:  Signed     :  Kiersten Farfan RN (NURS- Registered Nurse)            EMS Arrival Note  ________________________________    Pre hospital clinical presentation per patient and EMS personnel includes pt fell at home this morning 0430, picked up at 1330 by friend.  Hx of compartment syndrome and rhabdomyolysis other issues causing left leg weakness and no near no weightbearing.  Patient is alert.    Pre hospital care included: 118/75, 93-95% RA    Pre hospital prior living situation: Home

## 2017-12-30 NOTE — NURSING NOTE
Patient Information     Patient Name MRN Sandie Fuentes [id number] Female 1955      Nursing Note by Ruby Gupta at 2017  2:45 PM     Author:  Ruby Gupta Service:  (none) Author Type:  (none)     Filed:  2017  3:27 PM Encounter Date:  2017 Status:  Signed     :  Ruby Gupta            Patient presents to the clinic today for a follow up on meds.  Ruby Gupta ....................  2017   3:27 PM

## 2017-12-30 NOTE — NURSING NOTE
Patient Information     Patient Name MRN Sandie Fuentes [id number] Female 1955      Nursing Note by Ruby Gupta at 2017  2:45 PM     Author:  Ruby Gupta Service:  (none) Author Type:  (none)     Filed:  2017  3:30 PM Encounter Date:  2017 Status:  Signed     :  Ruby Gupta            Patient presents to the clinic today for a face to face for a wheel chair.    Ruby Gupta ....................  2017   2:53 PM

## 2017-12-30 NOTE — NURSING NOTE
Patient Information     Patient Name MRN Sex Sandie Kearns [id number] Female 1955      Nursing Note by Payal Klein at 2017  1:50 PM     Author:  Payal Klein Service:  (none) Author Type:  (none)     Filed:  2017  1:20 PM Encounter Date:  2017 Status:  Signed     :  Payal Klein            Here today to follow up with unna boot change.  Payal Klein LPN..........2017  1:20 PM

## 2017-12-30 NOTE — INITIAL ASSESSMENTS
Patient Information     Patient Name MRN Sex Sandie Kearns [id number] Female 1955      Initial Assessments by Peggy Santizo PT at 6/3/2017 12:43 PM     Author:  Peggy Santizo PT Service:  (none) Author Type:  PT- Physical Therapist     Filed:  6/3/2017  2:13 PM Date of Service:  6/3/2017 12:43 PM Status:  Signed     :  Peggy Santizo PT (PT- Physical Therapist)            Inpatient Initial Assessment  Physical Therapy  [facility]      Patient Name: Sandie Stuart    Medical and Treatment Diagnosis: (not recorded)    Date of Evaluation:  6/3/2017  Referring MD/Provider: (not recorded)     Assessment    Therapist Assessment:      Pt from home where she had been independent dressing, cooking, cleaning, and laundry. She does report that she needs help and assistance from friends with shopping.     Narrative Summary (Based on level of impairment and/or activity limitation) Patient demonstrating mild/moderate impairments and/or activity limitations.  Patient is not at baseline level of functional ability.  Patient would benefit from ongoing acute therapy services to reduce deficits.    Therapist Recommendation for PT/OT/SLP after discharge from acute care hospital (updated every visit):      Subacute Rehabilitation in a Skilled Nursing Facility (TCU).  The patient has limited tolerance for an intensive therapy schedule, and may require an extended period of time to make significant functional gains.    Activity Limitations:      Difficulty with Ambulation and Decreased Activity Tolerance    Precautions:  General Precautions:  Noted    Subjective:      Living Situation:    Style of Home:  1 Story         First Level            Bedroom            Bathroom:  Tub/Shower Combination            Kitchen            Living Area            Laundry Room        Outside Steps:  None.  Outside Stair Rails: 0       Inside Steps:  None.  Inside Stair Rails: 0       Available Equipment:   Walker (Rolling) and Walker (4 Wheeled)       Living Situation: Alone       Preadmission Mobility Status:  Independent      Caregiver(s) Available to Assist with Post D/C Care:  Friend / Neighbor is available for shopping and is able Not Assessed      Transportation / Driving:  Drove Prior to Hospitalization        Initial Pain Rating / Description:      7 = Severe Pain, (Miserable, Gnawing, Fierce, Piercing)/    Lower Extremities:  Ankle left, Foot left and Knee left    Acceptable Level of Pain:   3 = Mild Pain, (Bothersome, Annoying, Irritating, Nagging)    Objective  Additional Information:    Social and Medical History Reviewed:  Yes - see additional information section below for further details.    Past Medical History:     Diagnosis  Date     Anxiety disorder      Bilateral foot-drop 2016    following rhabdomyolysis      C. difficile colitis 3/7/2015     C. difficile colitis 5/27/2015     Chronic diarrhea 9/17/2015    Dr Woods, [facility]      Chronic pain      DVT (deep venous thrombosis) (HC) 12/2011    bilateral; chronic anticoag      Fingers fractured 07/19/07    fractured right 5th finger      GERD (gastroesophageal reflux disease)      History of rhabdomyolysis 12/2015, 3/2016    Hosp X2 at [facility] in [place]      Hx of ectopic pregnancy     x2      Leg ulcer, left (HC) 06/2010    began 6/2010; venous stasis ulcer; Dr Hayden      Lower extremity venous stasis     followed By Dr Hayden      Major depressive episode      Mood disorder (HC)      Paroxysmal tachycardia (HC)      QT prolongation 4/17/2017     Traumatic rhabdomyolysis (HC) 04/17/2017    hosp'd 7 days at [facility]      Vasculitis (HC) 1/2014    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   No      Patient is a vulnerable adult: Yes, due to patient hospitalization     Patient is aware of diagnosis: Yes      Risks and benefits explained: Yes     Objective Findings      Mobility:    Bed Mobility:      Roll:         LEFT: Complete [place]          RIGHT: Complete [place]       Scoot:  Complete [place]      Supine-Sit:  Complete [place]      Sit-Supine:  Complete [place]      Transfers:    Bed to Chair:  Supervision, Set-Up or Standby Prompting    Sit to Stand:  Complete [place]    Stand to Sit:  Complete [place]    Pivot Method:  Stand Pivot Transfer    Toilet:  Supervision, Set-Up or Standby Prompting       Ambulation:     On Level Surfaces:          Distance - < 5 feet with Rolling Walker        Assist:  Minimal Assistance           Balance:     Sitting Balance:  Good     Standing Balance:  Good     Balance Characteristics:  Fair     Fall Risk Screening:  Screening required for fall risk when two or more criteria present:   Any other observed patient behaviors in the clinic that would warrant additional screening    Screening required:  The patient is unable to stand without assist and therefore is a fall risk.    Endurance:      Endurance:  Poor      Complicating Factors:  Fatigue, Pain and Weakness    Sensation:  NT    Range of Motion:  Items not addressed indicate that the test was inappropriate or not meaningful at the time of evaluation and therefore not performed.    NT      Strength:  Items not addressed indicate that the test was inappropriate or not meaningful at the time of evaluation and therefore not  performed.       LLE:   Weak and Painful     RLE:  Weak and Painful    Today's Intervention:     THERAPEUTIC ACTIVITIES / FUNCTIONAL TRAINING IP:       Time:  15 Minutes       Bed Mobility Training       Transfer Training    Plan    Due to impairments as listed above plan for therapy as below:     Frequency:   Daily     Duration of Treatment: Length of stay or until goals are met.     Anticipate Core Outcome Goals to be achieved in LOS.     Interventions:     THERAPEUTIC ACTIVITIES / FUNCTIONAL TRAINING IP:       Bed Mobility Training       Transfer Training     GAIT TRAINING:       Level Surfaces:  Device:  Rolling Walker    Level of Assist:  Moderate Assistance      Patient's Goal(s):  Decrease Pain  PT GOALS:    TRANSFER:    Pivot Method:  Stand Pivot Transfer  Patient participated in goal selection and understand(s) the plan      of care: Yes     Treatment Plan for Next Visit:  Bed mobility training, Transfer training and Gait training    Student or PTA has been instructed in and demonstrates skills necessary to carry out above stated treatment plan as needed.    G codes and Modifier based on patient's presentation and clinical judgement:     Current Primary G Code and Modifier:    Per the Patient's intake and/or assessment the Primary G Code is: Mobility .   The Patient's Impairment, Limitation or Restriction Modifier would be best described as: CJ - 20% - 40% Impairment.     Goal Primary G Code and Modifier:    The Patient's G Code Goal would be: Mobility    The Patient's Impairment, Limitation or Restriction Modifier goal would be best described as: CI - 1% - 20% Impairment.   Discharge Primary G Code and Modifier:      The Patient's status upon Discharge is Mobility    The Patient's Impairment, Limitation or Restriction Modifier would be best described as CJ - 20% - 40% Impairment.       Minutes/Units of Time for this Session are documented on the flowsheet.

## 2017-12-30 NOTE — NURSING NOTE
Patient Information     Patient Name MRN Sandie Fuentes [id number] Female 1955      Nursing Note by Yasmeen Vee at 2017 12:00 PM     Author:  Yasmeen eVe Service:  (none) Author Type:  (none)     Filed:  2017 12:34 PM Encounter Date:  2017 Status:  Signed     :  Yasmeen Vee            Sandie Stuart is a 62 y.o. female (1955) who presents for ORIF left ankle     Date of Surgery: 17  Surgical Specialty: Orthopedic/Spine  DR Kostas Briggs  [facility]: [facility]   Surgery type: inpatient  Primary Physician: David Cottrell

## 2017-12-30 NOTE — NURSING NOTE
Patient Information     Patient Name MRN Sandie Fuentes [id number] Female 1955      Nursing Note by Jennifer Faith at 2017 11:15 AM     Author:  Jennifer Faith Service:  (none) Author Type:  (none)     Filed:  2017 12:06 PM Encounter Date:  2017 Status:  Signed     :  Jennifer Faith            Patient presents to clinic for surgical follow up. Surgery 17 on left leg.  Jennifer Chand ....................  2017   11:17 AM

## 2017-12-30 NOTE — INITIAL ASSESSMENTS
"Patient Information     Patient Name MRN Sandie Fuentes [id number] Female 1955      Initial Assessments by Sandra Deshpande OT at 2017  2:07 PM     Author:  Sandra Deshpande OT  Service:  (none) Author Type:  OT- Occupational Therapist     Filed:  2017  2:25 PM  Date of Service:  2017  2:07 PM Status:  Addendum     :  Sandra Deshpande OT (OT- Occupational Therapist)        Related Notes: Original Note by Sandra Deshpande OT (OT- Occupational Therapist) filed at 2017  2:23 PM               [facility]    Occupational Therapy Initial Assessment    Patient Name: Sandie Stuart   Medical and Treatment Diagnosis: Weakness    Date of Evaluation:  2017   Referring MD/Provider: Dr Solorzano     Assessment    Therapist Assessment    Patient is a 63 y/o female well know to this Therapist from several previous admissions, had just completed a course of short term rehab at [facility] and had been home for 1 week before she became increasingly weaker. Patient states she was able to walk short distances in to bathroom, etc at home prior to coming in. Patient also states she was independent with dressing and sponge bathing. At time of eval patient declined to get up stating \"I'm dead weight\" .     Narrative Summary (Based on level of impairment and/or activity limitation) Patient demonstrating significant impairments or activity limitations.  Patient is not at baseline level of functional ability.  Patient would benefit from ongoing acute therapy services to reduce deficits.    Therapist Recommendation for PT/OT/SLP after discharge from acute care hospital (updated every visit):   Subacute Rehabilitation in a Skilled Nursing Facility (TCU).  The patient has limited tolerance for an intensive therapy schedule, and may require an extended period of time to make significant functional gains.  G codes and Modifier based on patient's presentation and " clinical judgement:     Current Primary G Code and Modifier:    Per the Patient's intake and/or assessment the Primary G Code is: Self Care .   The Patient's Impairment, Limitation or Restriction Modifier would be best described as: CK - 40% - 60% Impairment.     Goal Primary G Code and Modifier:    The Patient's G Code Goal would be: Self Care    The Patient's Impairment, Limitation or Restriction Modifier goal would be best described as: CI - 1% - 20% Impairment.   Activity Limitations   Decreased Bed Mobility, Difficult Transfers, Difficulty with Ambulation, Decreased Activity Tolerance and Impaired ADL's: Dressing, Bathing and Toileting    Precautions:  General Precautions:    Bed Alarm    Fall Risk    Invasive Line/Tube      Subjective    Social and Medical History Reviewed: Yes    Current Living Situation:        Style of Home: Apartment    Tub/Shower Combination        Outside Steps: 2 Steps.  Outside Stair Rails: Not Assessed       Inside Steps: Not Assessed.  Inside Stair Rails: Not Assessed       Available Equipment: Walker (Rolling) and Walker (4 Wheeled)       Living Situation: Alone       Preadmission ADL/IADL Status: Independent Basic ADL         Preadmission Mobility Status: Independent with Assistive Device,       Walkers,  Type:  4 Wheeled Walker with Seat and Brakes      Caregiver(s) Available to Assist with Post D/C Care: Friend / Neighbor is available TBA and is able Not Assessed      Occupation:  Retired          Transportation / Driving: Did Not Drive Prior to Hospitalization    Initial Pain Rating / Description:       3 = Mild Pain, (Bothersome, Annoying, Irritating, Nagging) / Occurs with Activity and Occurs at Rest    Location:     Lower Extremities:  Ankle left, Foot left and Leg left  Acceptable Level of Pain:   0 = no pain, comfortable    Objective    Past Medical History, including information regarding previous hospital stay if pertinent:    Past Medical History:     Diagnosis   Date     Anxiety disorder      Bilateral foot-drop 2016    following rhabdomyolysis      C. difficile colitis 3/7/2015     C. difficile colitis 5/27/2015     Chronic diarrhea 9/17/2015    Dr Woods, [facility]      Chronic pain      DVT (deep venous thrombosis) (HC) 12/2011    bilateral; chronic anticoag      Fingers fractured 07/19/07    fractured right 5th finger      GERD (gastroesophageal reflux disease)      History of rhabdomyolysis 12/2015, 3/2016    Hosp X2 at [facility] in [place]      Hx of ectopic pregnancy     x2      Leg ulcer, left (HC) 06/2010    began 6/2010; venous stasis ulcer; Dr Hayden      Lower extremity venous stasis     followed By Dr Hayden      Major depressive episode      Mood disorder (HC)      Vasculitis (HC) 1/2014    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 causing her ulcer in her leg      Past Surgical History:      Procedure  Laterality Date     BUNIONECTOMY Left 5/2006    w/claw toes, later fractures       COLECTOMY Right 3/1/15    Right partial colectomy and ileum; Dr Hayden       COLONOSCOPY  2012    [place]--normal       ECTOPIC PREGNANCY SURGERY  Years ago    Twice       Hx closed reduction      R arm X2, childhood       Hx CTR Bilateral 1991,1993     Hx lipoma removal Left 1994    chest wall       Hx salpingectomy Bilateral 1981,1983    for ectopic pregnancies       Please see Problem List for Current Admission    Were cultural / age or other special adaptations needed? No     Patient is a vulnerable adult: Yes, due to patient hospitalization     Patient is aware of diagnosis: Yes      Risks and benefits explained: Yes    Fall Risk Screening:  Screening required for fall risk when two or more criteria present:  Any other observed patient behaviors in the clinic that would warrant additional screening    Screening required:  Yes  If yes due to balance issues, a referral to physical therapy is recommended    If yes, a referral to physical  therapy is recommended or communication with PT regarding fall risk; to be assessed formally by PT.    Objective Findings    Activities of Daily Living Assessment     Eating:  Supervision, Set-Up or Standby Prompting         Grooming:  Not Assessed            Bathing: To Be Assessed        Upper Body Dressing:   To Be Assessed            Lower Body Dressing: To Be Assessed        Toileting: To Be Assessed        Toilet Transfer: To Be Assessed            Walk-In Shower Transfer:  Not Assessed        Functional Mobility:  bed mobility Not Assessed     Instrumental Activities of Daily Living Assessment:    To Be Assessed as appropriate    Mental Functions:    No Concerns    Sensory Function:    Vision:     Are there visual concerns?  No      Hearing: Within Normal Limits      Tactile:       Light Touch, Intact     UE Motor Function:     Range of Motion:  Bilateral UE    AROM  Within Functional Limits    Cardiopulmonary Concerns:      TBA    Today's Intervention:    THERAPEUTIC ACTIVITIES / FUNCTIONAL TRAINING:     Functional Mobility    SELF CARE / HOME MANAGEMENT:     ADL Training       Plan    Due to impairments as listed above plan for therapy as below:    Frequency Treatment:  Daily    Duration of Treatment:  Length of stay or until goals met.    Anticipate Core Outcome Goals to be achieved in LOs.    Interventions:    THERAPEUTIC ACTIVITIES / FUNCTIONAL TRAINING:     Functional Mobility    SELF CARE / HOME MANAGEMENT:     ADL Training           Goals    Patient's Goal(s):  To return home as previous   OT Goal(s):  Patient will demonstrate SBA with bathing by time of discharge.  Patient will demonstrate SBA with dressing by time of discharge.  Patient will demonstrate  SBA with toileting by time of discharge.  Patient will demonstrate  SBA with toilet transfer by time of discharge.  Patient participated in goal selection and understand(s) the plan      of care: Yes     Plan for Next Treatment:    AM cares and  functional mobility     Student or MERRITT has been instructed in and demonstrates skills necessary to carry out above stated treatment plan.    Physician's Agreement with this plan of care can be found in the orders section of the medical chart.    Minutes/Units of Time for this Session are documented on the flowsheet.

## 2017-12-30 NOTE — NURSING NOTE
Patient Information     Patient Name MRN Sandie Fuentes [id number] Female 1955      Nursing Note by Mary Medina at 2017  2:40 PM     Author:  Mary Medina Service:  (none) Author Type:  (none)     Filed:  2017  1:27 PM Encounter Date:  2017 Status:  Signed     :  Mary Medina            Patient is here today for a follow up on her unna boot.  Mary Medina LPN.......................... 2017  1:09 PM

## 2017-12-30 NOTE — NURSING NOTE
Patient Information     Patient Name MRN Sex Sandie Kearns [id number] Female 1955      Nursing Note by Payal Klein at 2017  3:40 PM     Author:  Payal Klein Service:  (none) Author Type:  (none)     Filed:  2017  1:00 PM Encounter Date:  2017 Status:  Signed     :  Payal Klein            Here today for a possible unna boot placement.  Payal Klein LPN..........2017  12:56 PM

## 2017-12-30 NOTE — NURSING NOTE
Patient Information     Patient Name MRN Sandie Fuentes [id number] Female 1955      Nursing Note by Ruby Gupta at 2017 11:15 AM     Author:  Ruby Gupta Service:  (none) Author Type:  (none)     Filed:  2017 11:42 AM Encounter Date:  2017 Status:  Signed     :  Ruby Gupta            Patient presents to the clinic today for a follow up.  Ruby Gupta ....................  2017   11:22 AM

## 2018-01-02 NOTE — NURSING NOTE
Patient Information     Patient Name MRN Sex Sandie Kearns [id number] Female 1955      Nursing Note by Payal Klein at 2017  2:40 PM     Author:  Payal Klein Service:  (none) Author Type:  (none)     Filed:  2017  1:24 PM Encounter Date:  2017 Status:  Signed     :  Payal Klein            Presents today to follow up with yony faye.  Payal Klein LPN..........2017  1:19 PM

## 2018-01-02 NOTE — TELEPHONE ENCOUNTER
Patient Information     Patient Name MRN Sex Sandie Kearns [id number] Female 1955      Telephone Encounter by Payal Klein at 2017 11:02 AM     Author:  Payal Klein Service:  (none) Author Type:  (none)     Filed:  2017 11:03 AM Encounter Date:  2017 Status:  Signed     :  Payal Klein            Called patient to see why she does not have a follow up with .  Left message to call back    Payal Klein LPN..........2017  11:03 AM

## 2018-01-02 NOTE — PROGRESS NOTES
"Patient Information     Patient Name MRN Sandie Fuentes [id number] Female 1955      Progress Notes by Telly Hayden MD at 2017  2:40 PM     Author:  Telly Hayden MD Service:  (none) Author Type:  Physician     Filed:  2017  2:04 PM Encounter Date:  2017 Status:  Signed     :  Tlely Hayden MD (Physician)            Subjective:  This is a 61 y.o. female patient with venous stasis ulcer on the left lower extremity here for unna boot change.    Objective: /70  Pulse 90  Ht 1.676 m (5' 6\")    The Unnaboot was removed and the leg cleansed with Hibiclens. The ulcer remains completley healed .  The Unna boot was not replaced. Compressive stocking 20-30 mmHg placed    Assessment:   Venous stasis ulcer, now healed      Plan:  Follow up in two weeks for recheck.      Telly Hadyen MD FACS            "

## 2018-01-02 NOTE — TELEPHONE ENCOUNTER
Patient Information     Patient Name MRN Sex Sandie Kearns [id number] Female 1955      Telephone Encounter by Payal Klein at 2017  4:00 PM     Author:  Payal Klein Service:  (none) Author Type:  (none)     Filed:  2017  4:02 PM Encounter Date:  2017 Status:  Signed     :  Payal Klein            Has a follow up next week.  Payal Klein LPN..........2017  4:01 PM

## 2018-01-03 NOTE — TELEPHONE ENCOUNTER
Patient Information     Patient Name MRN Sex Sandie Kearns [id number] Female 1955      Telephone Encounter by Payal Klein at 2017  3:09 PM     Author:  Payal Klein Service:  (none) Author Type:  (none)     Filed:  2017  3:12 PM Encounter Date:  2017 Status:  Signed     :  Payal Klein            Left message to call back.  Need to schedule a follow up appointment.  Payal Klein LPN..........2017  3:11 PM

## 2018-01-03 NOTE — NURSING NOTE
Patient Information     Patient Name MRN Sandie Fuentes [id number] Female 1955      Nursing Note by Ruby Gupta at 2/15/2017  9:45 AM     Author:  Ruby Gupta Service:  (none) Author Type:  (none)     Filed:  2/15/2017 10:16 AM Encounter Date:  2/15/2017 Status:  Signed     :  Ruby Gupta            Patient presents to the clinic today for a med check.    Ruby Gupta LPN.................. 2/15/2017 10:08 AM

## 2018-01-03 NOTE — TELEPHONE ENCOUNTER
Patient Information     Patient Name MRN Sex Sandie Kearns [id number] Female 1955      Telephone Encounter by Payal Klein at 2017 10:01 AM     Author:  Payal Klein Service:  (none) Author Type:  (none)     Filed:  2017 10:03 AM Encounter Date:  2017 Status:  Signed     :  Payal Klein            Attempted to reach patient to verify her appointment on 17 with Dr. Hayden.  Does not answer her phone or respond back to left messages.  Payal Klein LPN..........2017  10:02 AM

## 2018-01-03 NOTE — TELEPHONE ENCOUNTER
Patient Information     Patient Name MRN Sandie Fuentes [id number] Female 1955      Telephone Encounter by Lisa Malone at 2017  2:38 PM     Author:  Lisa Malone Service:  (none) Author Type:  (none)     Filed:  2017  2:42 PM Encounter Date:  2017 Status:  Signed     :  Lisa Malone            WLR - PATIENT IS NEEDING A DX CHANGED ON A MEDICATION THAT WAS CALLED INTO THE PHARMACY. PATIENT STATED SHE NEEDS ANXIETY TAKEN OFF AND REPLACED WITH DEPRESSION. PLEASE CALL PATIENT.   Lisa Malone ....................  2017   2:38 PM

## 2018-01-03 NOTE — PATIENT INSTRUCTIONS
Patient Information     Patient Name MRN Sandie Fuentes [id number] Female 1955      Patient Instructions by Avelina Bourne RN at 3/17/2017  1:45 PM     Author:  Avelina Bourne RN Service:  (none) Author Type:  NURS- Registered Nurse     Filed:  3/17/2017  1:43 PM Encounter Date:  3/17/2017 Status:  Signed     :  Avelina Bourne RN (NURS- Registered Nurse)            2017 Details    Sun Mon Tue Wed Thu Fri Sat        1               2               3               4                 5               6               7               8               9               10               11                 12               13               14               15               16               17      2.5 mg   See details      18      2.5 mg           19      2.5 mg         20      5 mg         21      2.5 mg         22      2.5 mg         23      2.5 mg         24      2.5 mg         25      2.5 mg           26      2.5 mg         27      5 mg         28      2.5 mg         29      2.5 mg         30      2.5 mg         31      2.5 mg           Date Details    This INR check               How to take your warfarin dose     To take:  2.5 mg Take one of the 2.5 mg tablets.    To take:  5 mg Take one of the 5 mg tablets.           2017 Details    Sun Mon Tue Wed Thu Fri Sat           1      2.5 mg           2      2.5 mg         3      5 mg         4      2.5 mg         5               6               7               8                 9               10               11               12               13               14               15                 16               17               18               19               20               21               22                 23               24               25               26               27               28               29                 30                      Date Details   No additional details    Date of next INR:  2017          How to take your warfarin dose     To take:  2.5 mg Take one of the 2.5 mg tablets.    To take:  5 mg Take one of the 5 mg tablets.             Description          Take 5 mg x 1 days and 2.5 mg x 6 days recheck in 2 week. Avelina Bourne RN    3/17/2017  1:41 PM   PM          Anticoagulation Summary as of 3/17/2017     INR goal 2.0-3.0    Today's INR 1.9    Next INR check 4/4/2017          Call your Anticoagulation Clinic at Dept: [phone]   if:   1. Any medications are started, stopped, or there is a change in dose.  2. You experience any bleeding that is not easily stopped or if it is recurrent.  3. You notice an increase in bruising or any bruising that does not heal.  You are scheduled for surgery, colonoscopy, dental extraction or any other procedure where you may need to stop your Coumadin (warfarin).

## 2018-01-03 NOTE — PATIENT INSTRUCTIONS
Patient Information     Patient Name MRN Sandie Fuentes [id number] Female 1955      Patient Instructions by Pilar Luis RN at 2017  1:45 PM     Author:  Pilar Luis RN Service:  (none) Author Type:  NURS- Registered Nurse     Filed:  2017  2:08 PM Encounter Date:  2017 Status:  Signed     :  Pilar Luis RN (NURS- Registered Nurse)            2017 Details    Sun  Fri Sat        1               2               3               4                 5               6               7               8               9               10               11                 12               13               14               15               16               17               18                 19               20      5 mg   See details      21      2.5 mg         22      2.5 mg         23      2.5 mg         24      2.5 mg         25      2.5 mg           26      2.5 mg         27      5 mg         28                    Date Details    This INR check       Date of next INR:  2017         How to take your warfarin dose     To take:  2.5 mg Take one of the 2.5 mg tablets.    To take:  5 mg Take one of the 5 mg tablets.             Description          Decrease dose.  Take 5 mg x one day (today), then 2.5 mg daily.  Recheck INR in one week.   Discussed with the patient increased risk of clotting.  PILAR LUIS RN ....................  2017   2:08 PM            Anticoagulation Summary as of 2017     INR goal 2.0-3.0    Today's INR 1.0!    Next INR check 2017          Call your Anticoagulation Clinic at Dept: [phone]   if:   1. Any medications are started, stopped, or there is a change in dose.  2. You experience any bleeding that is not easily stopped or if it is recurrent.  3. You notice an increase in bruising or any bruising that does not heal.  You are scheduled for surgery, colonoscopy, dental extraction or any other  procedure where you may need to stop your Coumadin (warfarin).

## 2018-01-03 NOTE — TELEPHONE ENCOUNTER
Patient Information     Patient Name MRN Sandie Fuentes [id number] Female 1955      Telephone Encounter by Ruby Gupta at 2017  4:55 PM     Author:  Ruby Gupta Service:  (none) Author Type:  (none)     Filed:  2017  4:55 PM Encounter Date:  2017 Status:  Signed     :  Ruby Gupta            Left message to call back.   Ruby Gupta LPN.................. 2017 4:55 PM

## 2018-01-03 NOTE — PATIENT INSTRUCTIONS
Patient Information     Patient Name MRN Sex Sandie Kearns [id number] Female 1955      Patient Instructions by Avelina Bourne RN at 2/15/2017  2:15 PM     Author:  Avelina Bourne RN Service:  (none) Author Type:  NURS- Registered Nurse     Filed:  2/15/2017 11:45 AM Encounter Date:  2/15/2017 Status:  Signed     :  Avelina Bourne RN (NURS- Registered Nurse)            2017 Details    Sun  Sat        1               2               3               4                 5               6               7               8               9               10               11                 12               13               14               15      Hold   See details      16      Hold         17      5 mg         18                 19               20               21               22               23               24               25                 26               27               28                    Date Details   02/15 This INR check       Date of next INR:  2017         How to take your warfarin dose     To take:  5 mg Take one of the 5 mg tablets.    Hold Do not take your warfarin dose. The details table to the right may include additional information.                  Description          Hold x 2 days and recheck  Fri 17. Avelina Bourne RN    2/15/2017  11:18 AM              Anticoagulation Summary as of 2/15/2017     INR goal 2.0-3.0    Today's INR 6.4!    Next INR check 2017          Call your Anticoagulation Clinic at Dept: [phone]   if:   1. Any medications are started, stopped, or there is a change in dose.  2. You experience any bleeding that is not easily stopped or if it is recurrent.  3. You notice an increase in bruising or any bruising that does not heal.  You are scheduled for surgery, colonoscopy, dental extraction or any other procedure where you may need to stop your Coumadin (warfarin).

## 2018-01-03 NOTE — TELEPHONE ENCOUNTER
Patient Information     Patient Name MRN Sandie Fuentes [id number] Female 1955      Telephone Encounter by Gosselin, Norma J at 2017 11:58 AM     Author:  Gosselin, Norma J Service:  (none) Author Type:  (none)     Filed:  2017 12:01 PM Encounter Date:  2017 Status:  Signed     :  Gosselin, Norma J            Patient lost her oxycodone script for this month. Will you fill. Please advise.  Norma J Gosselin LPN....................  2017   11:59 AM

## 2018-01-03 NOTE — PROGRESS NOTES
Patient Information     Patient Name MRN Sex Sandie Kearns [id number] Female 1955      Progress Notes by Telly Hayden MD at 2017  1:30 PM     Author:  Telly Hayden MD Service:  (none) Author Type:  Physician     Filed:  2017  1:34 PM Encounter Date:  2017 Status:  Signed     :  Telly Hayden MD (Physician)            Subjective:  This is a follow up of  a venous stasis ulcer on left lower extremity. The patient has no complaints.     Objective: /74  Pulse 100  Wt 66.2 kg (146 lb)  BMI 23.57 kg/m2  The ulcer remains completely healed without erythema. The skin throughout remains discolored purplish/pink.     Assessment:   Venous stasis disease left lower extremity, ulcer has remained healed.  Plan:  Continue to wear her compressive stockings 20-30 mmHg and follow-up as needed

## 2018-01-03 NOTE — NURSING NOTE
Patient Information     Patient Name MRN Sandie Fuentes [id number] Female 1955      Nursing Note by Ruby Gupta at 3/2/2017  1:00 PM     Author:  Ruby Gupta Service:  (none) Author Type:  (none)     Filed:  3/2/2017  1:41 PM Encounter Date:  3/2/2017 Status:  Signed     :  Ruby Gupta            Patient presents to the clinic today for follow up on meds.  Ruby Gupta ....................  3/2/2017   1:22 PM

## 2018-01-03 NOTE — TELEPHONE ENCOUNTER
Patient Information     Patient Name MRN Sex Sandie Kearns [id number] Female 1955      Telephone Encounter by Enid Newberry at 2017  1:46 PM     Author:  Enid Newberry Service:  (none) Author Type:  (none)     Filed:  2017  1:46 PM Encounter Date:  2017 Status:  Signed     :  Enid Newberry            Left message to call back  ....................Chasidy Newberry LPN  2017   1:46 PM

## 2018-01-03 NOTE — PATIENT INSTRUCTIONS
Patient Information     Patient Name MRN Sandie Fuentes [id number] Female 1955      Patient Instructions by Avelina Bourne RN at 2017  2:15 PM     Author:  Avelina Bourne RN Service:  (none) Author Type:  NURS- Registered Nurse     Filed:  2017  1:59 PM Encounter Date:  2017 Status:  Signed     :  Avelina Bourne RN (NURS- Registered Nurse)            2017 Details    Sun Mon  Thu Fri Sat        1      5 mg   See details      2      2.5 mg         3      5 mg         4      2.5 mg           5      2.5 mg         6      2.5 mg         7      2.5 mg         8    5 mg         9      2.5 mg         10      5 mg         11      2.5 mg           12      2.5 mg         13      2.5 mg         14      2.5 mg         15      5 mg         16               17               18                 19               20               21               22               23               24               25                 26               27               28                    Date Details    This INR check       Date of next INR:  2/15/2017         How to take your warfarin dose     To take:  2.5 mg Take one of the 2.5 mg tablets.    To take:  5 mg Take one of the 5 mg tablets.             Description          Increase dose and recheck in 2 weeks. .............Avelina Bourne RN.......... 2017  1:59 PM            Anticoagulation Summary as of 2017     INR goal 2.0-3.0    Today's INR 1.6    Next INR check 2/15/2017          Call your Anticoagulation Clinic at Dept: [phone]   if:   1. Any medications are started, stopped, or there is a change in dose.  2. You experience any bleeding that is not easily stopped or if it is recurrent.  3. You notice an increase in bruising or any bruising that does not heal.  You are scheduled for surgery, colonoscopy, dental extraction or any other procedure where you may need to stop your Coumadin (warfarin).

## 2018-01-03 NOTE — TELEPHONE ENCOUNTER
Patient Information     Patient Name MRN Sandie Fuentes [id number] Female 1955      Telephone Encounter by Gosselin, Norma J at 2017  2:07 PM     Author:  Gosselin, Norma J Service:  (none) Author Type:  (none)     Filed:  2017  2:08 PM Encounter Date:  2017 Status:  Signed     :  Gosselin, Norma J            Left message that script will not be fill and to schedule an appointment .  Norma J Gosselin LPN....................  2017   2:08 PM

## 2018-01-03 NOTE — PATIENT INSTRUCTIONS
Patient Information     Patient Name MRN Sandie Fuentes [id number] Female 1955      Patient Instructions by Avelina Bourne RN at 2017  2:15 PM     Author:  Avelina Bourne RN Service:  (none) Author Type:  NURS- Registered Nurse     Filed:  2017  2:20 PM Encounter Date:  2017 Status:  Signed     :  Avelina Bourne RN (NURS- Registered Nurse)            2017 Details    Sun  Wed Thu Fri Sat     1               2               3               4               5               6               7                 8               9      2.5 mg   See details      10      2.5 mg         11      2.5 mg         12      2.5 mg         13      5 mg         14      2.5 mg           15      2.5 mg         16      2.5 mg         17      2.5 mg         18      2.5 mg         19      2.5 mg         20      5 mg         21      2.5 mg           22      2.5 mg         23      2.5 mg         24               25               26               27               28                 29               30               31                    Date Details    This INR check       Date of next INR:  2017         How to take your warfarin dose     To take:  2.5 mg Take one of the 2.5 mg tablets.    To take:  5 mg Take one of the 5 mg tablets.             Description          Continue same Warfarin dose and recheck in 2 weeks.  Avelina Bourne RN    2017  2:19 PM          Anticoagulation Summary as of 2017     INR goal 2.0-3.0    Today's INR 2.2    Next INR check 2017          Call your Anticoagulation Clinic at Dept: [phone]   if:   1. Any medications are started, stopped, or there is a change in dose.  2. You experience any bleeding that is not easily stopped or if it is recurrent.  3. You notice an increase in bruising or any bruising that does not heal.  You are scheduled for surgery, colonoscopy, dental extraction or any other procedure where you may need to stop  your Coumadin (warfarin).

## 2018-01-03 NOTE — TELEPHONE ENCOUNTER
Patient Information     Patient Name MRN Sandie Fuentes [id number] Female 1955      Telephone Encounter by David Cottrell MD at 2017 12:09 PM     Author:  David Cottrell MD Service:  (none) Author Type:  Physician     Filed:  2017 12:12 PM Encounter Date:  2017 Status:  Signed     :  David Cottrell MD (Physician)            Narcotic prescriptions cannot be filled without being seen. The contract states that lost prescriptions will not be replaced.  David Cottrell MD ....................  2017   12:10 PM

## 2018-01-03 NOTE — PATIENT INSTRUCTIONS
Patient Information     Patient Name MRN Sandie Fuentes [id number] Female 1955      Patient Instructions by Belkis Hernandez RN at 2017 12:30 PM     Author:  Belkis Hernandez RN Service:  (none) Author Type:  NURS- Registered Nurse     Filed:  2017 12:49 PM Encounter Date:  2017 Status:  Signed     :  Belkis Hernandez RN (NURS- Registered Nurse)            2017 Details    Sun Mon Tue Wed Thu Fri Sat        1               2               3               4                 5               6               7               8               9               10               11                 12               13               14               15               16               17               18                 19               20               21               22               23               24               25                 26               27      7.5 mg   See details      28      2.5 mg              Date Details    This INR check               How to take your warfarin dose     To take:  2.5 mg Take one of the 2.5 mg tablets.    To take:  7.5 mg Take one of the 5 mg tablets and one of the 2.5 mg tablets.           2017 Details    Sun Mon Tue Wed Thu Fri Sat        1      5 mg         2      2.5 mg         3      2.5 mg         4      2.5 mg           5      2.5 mg         6      5 mg         7               8               9               10               11                 12               13               14               15               16               17               18                 19               20               21               22               23               24               25                 26               27               28               29               30               31                 Date Details   No additional details    Date of next INR:  3/6/2017         How to take your warfarin dose     To take:  2.5 mg Take one of the  2.5 mg tablets.    To take:  5 mg Take one of the 5 mg tablets.             Description           Take 7.5 mg x one day (today), then  5mg x 2 day and 2.5mg x 5 days.  Recheck INR in one week.   Discussed with the patient increased risk of clotting.  SANDIE Hernandez RN        Anticoagulation Summary as of 2/27/2017     INR goal 2.0-3.0    Today's INR     Next INR check 3/6/2017          Call your Anticoagulation Clinic at Dept: [phone]   if:   1. Any medications are started, stopped, or there is a change in dose.  2. You experience any bleeding that is not easily stopped or if it is recurrent.  3. You notice an increase in bruising or any bruising that does not heal.  You are scheduled for surgery, colonoscopy, dental extraction or any other procedure where you may need to stop your Coumadin (warfarin).

## 2018-01-03 NOTE — NURSING NOTE
Patient Information     Patient Name MRN Sex Sandie Kearns [id number] Female 1955      Nursing Note by Payal Klein at 2017  1:30 PM     Author:  Payal Klein Service:  (none) Author Type:  (none)     Filed:  2017  1:09 PM Encounter Date:  2017 Status:  Signed     :  Payal Klein            Presents today to follow up with left leg wound care.  Payal Klein LPN..........2017  1:04 PM

## 2018-01-03 NOTE — PROGRESS NOTES
Patient Information     Patient Name MRN Sandie Fuentes [id number] Female 1955      Progress Notes by David Cottrell MD at 2/15/2017  9:45 AM     Author:  David Cottrell MD Service:  (none) Author Type:  Physician     Filed:  2/15/2017  3:23 PM Encounter Date:  2/15/2017 Status:  Signed     :  David Cottrell MD (Physician)            SUBJECTIVE:  62 y.o. female who presents for follow-up of her pain medication and discussion. She had a refill for this month but she has been decreasing her dosage and so has not run out yet. She would like to get new prescriptions and try going down to 7 per day which she's been doing successfully. She also is trying to wean down Dexedrine. She has been doing much better since her leg ulcers have totally healed. She will be following up with Dr. Hayden only if they recur. She is quite happy with how things are current. Her depression symptoms have improved considerably with this finding.    She has an appointment with Dr. Elizabeth next week to review her foot drop problem. She may need different bracing.    She's been having some pain in the left wrist after taking a fall a month or so ago. She is concerned about the possibility of a fracture. It's not real severe but enough to be bothersome.    Her present dose of OxyContin before tapering was 80 mg daily or 120 morphine equivalents. Going to 70 mg daily will be 105 morphine equivalents. Other medications remain the same. She has had no problems with interactions between the narcotics and the benzodiazepine's. Overall she is feeling better.    She had an appointment at the pro time clinic today and her pro time was quite elevated. Coumadin is going to be held for 2 days and then she will have her INR rechecked.    She is due for other follow-up including lab work and a mammogram. She states she had a mammogram done elsewhere, and we'll probably do that again. She said she would schedule her  own appointment.    She has no new problems or concerns otherwise.    Additional Review of Systems: see HPI:   Symptoms are all related to her leg and her chronic pain. She denies any cardiopulmonary, GI, , other neurologic or rheumatologic problems. She states her psychiatric status has been stable and she tries to keep a positive outlook.    Past Medical History      Diagnosis   Date     Anxiety disorder       Bilateral foot-drop  2016     following rhabdomyolysis      C. difficile colitis  3/7/2015     C. difficile colitis  5/27/2015     Chronic diarrhea  9/17/2015     Dr Woods, GI [facility]      Chronic pain       DVT (deep venous thrombosis) (HC)  12/2011     bilateral; chronic anticoag      Fingers fractured  07/19/07     fractured right 5th finger      GERD (gastroesophageal reflux disease)       History of rhabdomyolysis  12/2015, 3/2016     Hosp X2 at [facility] in [place]      Hx of ectopic pregnancy       x2      Leg ulcer, left (HC)  06/2010     began 6/2010; venous stasis ulcer; Dr Hayden      Lower extremity venous stasis       followed By Dr Hayden      Major depressive episode       Mood disorder (HC)       Vasculitis (HC)  1/2014     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 Max acetaminophen dose: 3000mg in 24 hrs.       ALPRAZolam (XANAX) 1 mg tablet Take 1 tablet by mouth 3 times daily if needed for Anxiety. 90 tablet 5     cholecalciferol (VITAMIN D) 1,000 unit capsule Take 1 capsule by mouth once daily. 30 capsule 11     cholestyramine-sucrose 4 G per scoop (QUESTRAN) 4 gram powder Take 4 g by mouth 2 times daily.       desipramine 100 mg tablet TAKE 1 TABLET BY MOUTH EVERY DAY 90 tablet 0     diphenoxylate-atropine, 2.5-0.025 mg, (LOMOTIL)  2.5-0.025 mg tablet Take by mouth as needed for diarrhea. Take 2 tablets for first loose stool, then 1 tablet for each subsequent loose stool. Maximum of 8 tablets per day.       ergocalciferol (VITAMIN D) 50,000 unit capsule Take 1 capsule by mouth every Monday and Thursday. 12 capsule 0     gabapentin (NEURONTIN) 300 mg capsule Take 3 capsules by mouth 3 times daily. 270 capsule 11     hydrochlorothiazide (HCTZ) 25 mg tablet Take 12.5 mg - 25 mg by mouth once daily as needed for edema.       L.acidophilus-Bif. animalis (ONE-A-DAY TRUBIOTICS) 2 billion cell chew Take 1 capsule by mouth once daily.  0     LACTOBACILLUS ACIDOPHILUS (PROBIOTIC ORAL) Take 1 capsule by mouth once daily. One-A-Day Trubiotics Capsules (1.5 billion cells, Lactobacillus acidophilus/Bifidobacterium animalis)       loperamide (IMODIUM) 2 mg capsule Take by mouth as needed for diarrhea. Take 4mg by mouth with 1st loose stool, then 2mg with each subsequent loose stool. Max 16 mg in 24 hrs.       magnesium oxide (MAG-) 400 mg tablet Take 1 tablet by mouth 2 times daily. 180 tablet 3     multivit-iron-FA-calcium-mins (ONE-A-DAY WOMEN'S PETITES) 9 mg iron-200 mcg tab Take 1 Tab by mouth once daily.  0     multivitamin (MVI) tablet Take 1 tablet by mouth once daily. 60 tablet 0     nadolol (CORGARD) 40 mg tablet Take 1 tablet by mouth once daily. 90 tablet 4     nortriptyline 50 mg capsule TAKE 2 CAPSULES BY MOUTH AT BEDTIME 180 capsule 0     oxyCODONE 10 mg tablet Take 2 tablets by mouth 3 times daily  And 10 mg po at night--total 7 tabs/day 210 tablet 0     pantoprazole (PROTONIX) 40 mg delayed-release tablet Take 1 tablet by mouth once daily. 90 tablet 3     POLYVINYL ALCOHOL (NATURAL TEARS OPHT) Place 1-2 Drops into the eye(s) once daily if needed (Dry Eyes).       warfarin (COUMADIN) 2.5 mg tablet Take 5 mg x 1 day and 2.5 mg x 6 days/week or as directed by protime 90 tablet 0     No current facility-administered medications for this  "visit.      Medications have been reviewed by me and are current to the best of my knowledge and ability.      Allergies as of 02/15/2017 - Mack as Reviewed 02/15/2017      Allergen  Reaction Noted     Erythromycin Rash 12/13/2012     Sulfa (sulfonamide antibiotics) Rash 12/13/2012        OBJECTIVE:  Visit Vitals       /80     Pulse 72     Ht 1.676 m (5' 6\")     Breastfeeding No     EXAM:  General Appearance: Pleasant, alert, appropriate appearance for age. No acute distress  Musculoskeletal Exam: She moves fairly well by using her walker. Her bilateral foot drop is braced. Her right foot is in turning almost 90 .  Examination of the left wrist reveals no gross deformity. There is a palpable lump at the base of the thumb which is mildly tender. Range of motion is full of both the wrist and the thumb and distal CMS is intact.  Neurologic Exam: No new focal findings  Psychiatric Exam: Alert and oriented, appropriate affect.  X-ray of the wrist shows severe degenerative change at the base of the thumb. I reviewed the x-ray with radiology.  Subsequently a CBC, sedimentation rate, and C-reactive protein were done and were all normal.    ASSESSMENT/PLAN:  Chronic pain-improving. Vasculitis is improved and her ulcer has improved. We will decrease her oxycodone to 7 tablets daily, 70 mg, 105 morphine equal lungs, from 80 mg daily. Refills were given for now and one month from now and then we will see her back in follow-up in 2 months. She has Dexedrine to last for at least one month and we'll attempt to taper down and so we'll see her back in early April for refills.    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 She is seeing Dr. Elizabeth next week so I suggested she may ask him to take a look at the x-ray.    Vasculitis and ulcer-stable    Depression-stable    Bilateral foot drop-orthopedic consult pending    Plan: BMP reviewed. Toxicology screen was done in " June 2016. Meds were refilled for 2 months and we'll see her back in follow-up in April. Encouraged her to have the mammogram done. Reassured about the lab findings. Continue other medications the same.  David Cottrell MD ....................  2/15/2017   3:23 PM

## 2018-01-03 NOTE — TELEPHONE ENCOUNTER
Patient Information     Patient Name MRN Sandie Fuentes [id number] Female 1955      Telephone Encounter by Yamilet Smith at 2017  1:32 PM     Author:  Yamilet Smith Service:  (none) Author Type:  (none)     Filed:  2017  1:32 PM Encounter Date:  2017 Status:  Signed     :  Yamilet Smith            Left message to call back  ....................  2017   1:32 PM  Yamilet Smith LPN  2017  1:32 PM

## 2018-01-03 NOTE — PATIENT INSTRUCTIONS
Patient Information     Patient Name MRN Sandie Fuentes [id number] Female 1955      Patient Instructions by Avelina Bourne RN at 3/7/2017  1:15 PM     Author:  Avelina Bourne RN Service:  (none) Author Type:  NURS- Registered Nurse     Filed:  3/7/2017 12:59 PM Encounter Date:  3/7/2017 Status:  Signed     :  Avelina Bourne RN (NURS- Registered Nurse)            2017 Details    Sun  Thu Fri Sat        1               2               3               4                 5               6               7      2.5 mg   See details      8      5 mg         9      2.5 mg         10      2.5 mg         11      2.5 mg           12      2.5 mg         13      5 mg         14               15               16               17               18                 19               20               21               22               23               24               25                 26               27               28               29               30               31                 Date Details    This INR check       Date of next INR:  3/13/2017         How to take your warfarin dose     To take:  2.5 mg Take one of the 2.5 mg tablets.    To take:  5 mg Take one of the 5 mg tablets.             Description          Take 5 mg x 2 days and 2.5 mg x 5 days recheck in 1 week. Avelina Bourne RN    3/7/2017  12:57 PM          Anticoagulation Summary as of 3/7/2017     INR goal 2.0-3.0    Today's INR 4.2    Next INR check 3/13/2017          Call your Anticoagulation Clinic at Dept: [phone]   if:   1. Any medications are started, stopped, or there is a change in dose.  2. You experience any bleeding that is not easily stopped or if it is recurrent.  3. You notice an increase in bruising or any bruising that does not heal.  You are scheduled for surgery, colonoscopy, dental extraction or any other procedure where you may need to stop your Coumadin (warfarin).

## 2018-01-03 NOTE — PROGRESS NOTES
Patient Information     Patient Name MRN Sandie Fuentes [id number] Female 1955      Progress Notes by David Cottrell MD at 3/2/2017  1:00 PM     Author:  David Cottrell MD Service:  (none) Author Type:  Physician     Filed:  3/2/2017  2:29 PM Encounter Date:  3/2/2017 Status:  Signed     :  David Cottrell MD (Physician)            SUBJECTIVE:  62 y.o. female who presents for discussion of her disabilities and paperwork completion for insurance policies. She has 2 different policies which cover disability and one is for her left foot drop and one for the right foot drop. We reviewed these today. She is seeing Dr. Elizabeth next week and then may be having a new orthotic her brace made.    Her pain has been fairly well controlled. She is not having any problems otherwise. She does note that the right great toe still has an open area in the lateral aspect but it is improving.    Additional Review of Systems: see HPI:   No new symptoms otherwise. She does need warfarin refilled.    Past Medical History      Diagnosis   Date     Anxiety disorder       Bilateral foot-drop       following rhabdomyolysis      C. difficile colitis  3/7/2015     C. difficile colitis  2015     Chronic diarrhea  2015     [facility]      Chronic pain       DVT (deep venous thrombosis) (HC)  2011     bilateral; chronic anticoag      Fingers fractured  07     fractured right 5th finger      GERD (gastroesophageal reflux disease)       History of rhabdomyolysis  2015, 3/2016     Hosp X2 at [facility] in [place]      Hx of ectopic pregnancy       x2      Leg ulcer, left (HC)  2010     began 2010; venous stasis ulcer; Dr Hayden      Lower extremity venous stasis       followed By Dr Hayden      Major depressive episode       Mood disorder (HC)       Vasculitis (HC)  2014     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 Max acetaminophen dose: 3000mg in 24 hrs.       ALPRAZolam (XANAX) 1 mg tablet Take 1 tablet by mouth 3 times daily if needed for Anxiety. 90 tablet 5     cholecalciferol (VITAMIN D) 1,000 unit capsule Take 1 capsule by mouth once daily. 30 capsule 11     cholestyramine-sucrose 4 G per scoop (QUESTRAN) 4 gram powder Take 4 g by mouth 2 times daily.       desipramine 100 mg tablet TAKE 1 TABLET BY MOUTH EVERY DAY 90 tablet 0     diphenoxylate-atropine, 2.5-0.025 mg, (LOMOTIL) 2.5-0.025 mg tablet Take by mouth as needed for diarrhea. Take 2 tablets for first loose stool, then 1 tablet for each subsequent loose stool. Maximum of 8 tablets per day.       ergocalciferol (VITAMIN D) 50,000 unit capsule Take 1 capsule by mouth every Monday and Thursday. 12 capsule 0     gabapentin (NEURONTIN) 300 mg capsule Take 3 capsules by mouth 3 times daily. 270 capsule 11     hydrochlorothiazide (HCTZ) 25 mg tablet Take 12.5 mg - 25 mg by mouth once daily as needed for edema.       L.acidophilus-Bif. animalis (ONE-A-DAY TRUBIOTICS) 2 billion cell chew Take 1 capsule by mouth once daily.  0     LACTOBACILLUS ACIDOPHILUS (PROBIOTIC ORAL) Take 1 capsule by mouth once daily. One-A-Day Trubiotics Capsules (1.5 billion cells, Lactobacillus acidophilus/Bifidobacterium animalis)       loperamide (IMODIUM) 2 mg capsule Take by mouth as needed for diarrhea. Take 4mg by mouth with 1st loose stool, then 2mg with each subsequent loose stool. Max 16 mg in 24 hrs.       magnesium oxide (MAG-) 400 mg tablet Take 1 tablet by mouth 2 times daily. 180 tablet 3     multivit-iron-FA-calcium-mins (ONE-A-DAY WOMEN'S PETITES) 9 mg iron-200 mcg tab Take 1 Tab by mouth once daily.  0     multivitamin (MVI) tablet Take 1 tablet by mouth once daily. 60  "tablet 0     nadolol (CORGARD) 40 mg tablet Take 1 tablet by mouth once daily. 90 tablet 4     nortriptyline 50 mg capsule TAKE 2 CAPSULES BY MOUTH AT BEDTIME 180 capsule 0     oxyCODONE 10 mg tablet Take 2 tablets by mouth 3 times daily  And 10 mg po at night--total 7 tabs/day 210 tablet 0     pantoprazole (PROTONIX) 40 mg delayed-release tablet Take 1 tablet by mouth once daily. 90 tablet 3     POLYVINYL ALCOHOL (NATURAL TEARS OPHT) Place 1-2 Drops into the eye(s) once daily if needed (Dry Eyes).       warfarin (COUMADIN) 2.5 mg tablet Take 5 mg x 1 day and 2.5 mg x 6 days/week or as directed by protime 100 tablet 4     No current facility-administered medications for this visit.      Medications have been reviewed by me and are current to the best of my knowledge and ability.      Allergies as of 03/02/2017 - Mack as Reviewed 03/02/2017      Allergen  Reaction Noted     Erythromycin Rash 12/13/2012     Sulfa (sulfonamide antibiotics) Rash 12/13/2012        OBJECTIVE:  Visit Vitals       /80     Ht 1.676 m (5' 6\")     Wt 66.2 kg (146 lb)     Breastfeeding No     BMI 23.57 kg/m2     EXAM:  General Appearance: Pleasant, alert, appropriate appearance for age. No acute distress  Musculoskeletal Exam: Footdrop date braces are in place and are functioning fairly well. She still in turn for right toe and may need a separate brace for that      ASSESSMENT/PLAN:  Bilateral foot drop. Forms were completed for insurance.    Chronic pain-stable-she has an appointment scheduled to come back next month.  David Cottrell MD ....................  3/2/2017   2:29 PM            "

## 2018-01-04 NOTE — NURSING NOTE
Patient Information     Patient Name MRN Sandie Fuentes [id number] Female 1955      Nursing Note by Ruby Gupta at 2017  1:30 PM     Author:  Ruby Gupta Service:  (none) Author Type:  (none)     Filed:  2017  2:09 PM Encounter Date:  2017 Status:  Signed     :  Ruby Gupta            Patient presents to the clinic today for med management.    Ruby Gupta ....................  2017   1:43 PM

## 2018-01-04 NOTE — TELEPHONE ENCOUNTER
Patient Information     Patient Name MRN Sandie Fuentes [id number] Female 1955      Telephone Encounter by Ryann Cottrell at 2017  1:45 PM     Author:  Ryann Cottrell Service:  (none) Author Type:  (none)     Filed:  2017  1:49 PM Encounter Date:  2017 Status:  Signed     :  Ryann Cottrell            WHEN PATIENT WAS ADMITED TO [facility] ON THE  SHE WAS TAKING CORGARD 40MG.  PUT IT ON HOLD. PATIENT WANTS TO BE STARTED ON THIS.    Ryann Cottrell ....................  2017   1:48 PM

## 2018-01-04 NOTE — PROGRESS NOTES
Patient Information     Patient Name MRN Sandie Fuentes [id number] Female 1955      Progress Notes by Telly Hayden MD at 4/10/2017  3:30 PM     Author:  Telly Hayden MD Service:  (none) Author Type:  Physician     Filed:  4/10/2017  1:07 PM Encounter Date:  4/10/2017 Status:  Signed     :  Telly Hayden MD (Physician)            Subjective:  This is a follow up of an open wound on left leg. The patient has some pain. Eating and passing flatus, but not as much as usual.    Objective: /80  Pulse 62  Wt 62.6 kg (138 lb)  BMI 22.27 kg/m2  The wound is  without erythema. Granulation starting. Exudate much less. It is 3.5 x 4 cm.     Assessment:   Stable left leg venous stasis ulcer    Plan:  Normal saline wet-to-dry dressing twice daily and follow up in one week, then anticipate an Unna boot.

## 2018-01-04 NOTE — TELEPHONE ENCOUNTER
Patient Information     Patient Name MRN Sandie Fuentes [id number] Female 1955      Telephone Encounter by Ruby Gupta at 2017 10:26 AM     Author:  Ruby Gupta Service:  (none) Author Type:  (none)     Filed:  2017 10:27 AM Encounter Date:  2017 Status:  Signed     :  Ruby Gupta notified of below.  Ruby Gupta ....................  2017   10:27 AM

## 2018-01-04 NOTE — TELEPHONE ENCOUNTER
Patient Information     Patient Name MRN Sandie Fuentes [id number] Female 1955      Telephone Encounter by Rbuy Gupta at 2017  1:44 PM     Author:  Ruby Gupta Service:  (none) Author Type:  (none)     Filed:  2017  1:45 PM Encounter Date:  2017 Status:  Signed     :  Ruby Gupta            [facility] was notified of previous message.    Ruby Gupta ....................  2017   1:44 PM

## 2018-01-04 NOTE — TELEPHONE ENCOUNTER
Patient Information     Patient Name MRN Sex Sandie Kearns [id number] Female 1955      Telephone Encounter by David Cottrell MD at 2017  9:43 AM     Author:  David Cottrell MD Service:  (none) Author Type:  Physician     Filed:  2017  9:44 AM Encounter Date:  2017 Status:  Signed     :  David Cottrell MD (Physician)            Correct dose is 12.5 mg  David Cottrell MD ....................  2017   9:44 AM

## 2018-01-04 NOTE — TELEPHONE ENCOUNTER
Patient Information     Patient Name MRN Sandie Fuentes [id number] Female 1955      Telephone Encounter by David Cottrell MD at 2017  2:05 PM     Author:  David Cottrell MD Service:  (none) Author Type:  Physician     Filed:  2017  2:06 PM Encounter Date:  2017 Status:  Signed     :  David Cottrell MD (Physician)            Corgard can be restarted at 40 mg daily.  David Cottrell MD ....................  2017   2:05 PM

## 2018-01-04 NOTE — TELEPHONE ENCOUNTER
Patient Information     Patient Name MRN Sandie Fuentes [id number] Female 1955      Telephone Encounter by Melissa Car at 2017  3:29 PM     Author:  Melissa Car Service:  (none) Author Type:  (none)     Filed:  2017  3:30 PM Encounter Date:  2017 Status:  Signed     :  Melissa Car            [facility] needs clarification on medication.    Melissa Car ....................  2017   3:30 PM

## 2018-01-04 NOTE — TELEPHONE ENCOUNTER
Patient Information     Patient Name MRN Sandie Fuentes [id number] Female 1955      Telephone Encounter by Ruby Gupta at 2017  2:16 PM     Author:  Ruby Gupta  Service:  (none) Author Type:  (none)     Filed:  2017  2:17 PM  Encounter Date:  2017 Status:  Addendum     :  Ruby Gupta        Related Notes: Original Note by Ruby Gupta filed at 2017  2:17 PM            She states that her BP's have been good- 100/67 124/84 (she will fax list of readings) and that the patient is just requesting it. She will need a new prescription sent.     Ruby Gupta ....................  2017   2:17 PM

## 2018-01-04 NOTE — TELEPHONE ENCOUNTER
Patient Information     Patient Name MRN Sandie Fuentes [id number] Female 1955      Telephone Encounter by Ruby Gupta at 4/10/2017  4:45 PM     Author:  Ruby Gupta Service:  (none) Author Type:  (none)     Filed:  4/10/2017  4:45 PM Encounter Date:  4/10/2017 Status:  Signed     :  Ruby Gupta            PA will be completed.    Ruby Gupta ....................  4/10/2017   4:45 PM

## 2018-01-04 NOTE — NURSING NOTE
Patient Information     Patient Name MRN Sex Sandie Kearns [id number] Female 1955      Nursing Note by Payal Klein at 4/10/2017  3:30 PM     Author:  Payal Klein Service:  (none) Author Type:  (none)     Filed:  4/10/2017 12:52 PM Encounter Date:  4/10/2017 Status:  Signed     :  Payal Klein            Here today to follow up with wound care.  Payal Klein LPN..........4/10/2017  12:49 PM

## 2018-01-04 NOTE — TELEPHONE ENCOUNTER
"Patient Information     Patient Name MRN Sandie Fuentes [id number] Female 1955      Telephone Encounter by Ruby Gupta at 2017  3:35 PM     Author:  Ruby Gupta Service:  (none) Author Type:  (none)     Filed:  2017  3:53 PM Encounter Date:  2017 Status:  Signed     :  Ruby Gupta            [facility] is calling to clarify order for hydrochlorothiazide 25 mg tab \"take 12.5-25 mg by mouth every day as needed\". They to need to clarify the directions of when should they give 12.5 and when they should give 25 mg. Okay to wait until .    Ruby Gupta ....................  2017   3:36 PM         "

## 2018-01-04 NOTE — PROGRESS NOTES
Patient Information     Patient Name MRN Sex Sandie Kearns [id number] Female 1955      Progress Notes by Telly Hayden MD at 2017  1:50 PM     Author:  Telly Hayden MD Service:  (none) Author Type:  Physician     Filed:  2017  1:54 PM Encounter Date:  2017 Status:  Signed     :  Telly Hayden MD (Physician)            Subjective:  This is a 62 y.o. female patient with venous stasis ulcer on the left lower extremity here for unna boot change.    Objective: /80  Pulse 100  Breastfeeding? No    The Unnaboot was removed and the leg cleansed with Hibiclens. The ulcer is granulating, without erythema. Size unchanged. Promogran Ana applied.  The Unna boot was replaced.    Assessment:   Venous stasis ulcer LLE      Plan:  Follow up in one week for Unna boot change.  Encouraged protein intake  Telly Hayden MD FACS

## 2018-01-04 NOTE — PATIENT INSTRUCTIONS
Patient Information     Patient Name MRN Sandie Fuentes [id number] Female 1955      Patient Instructions by Avelina Bourne RN at 2017  2:15 PM     Author:  Avelina Bourne RN Service:  (none) Author Type:  NURS- Registered Nurse     Filed:  2017  2:35 PM Encounter Date:  2017 Status:  Signed     :  Avelina Bourne RN (NURS- Registered Nurse)            May 2017 Details    Sun Mon Tue Wed Thu Fri Sat      1               2               3               4               5               6                 7               8      2.5 mg   See details      9      2.5 mg         10      2.5 mg         11      2.5 mg         12      5 mg         13      2.5 mg           14      2.5 mg         15      2.5 mg         16      2.5 mg         17               18               19               20                 21               22               23               24               25               26               27                 28               29               30               31                   Date Details    This INR check       Date of next INR:  2017         How to take your warfarin dose     To take:  2.5 mg Take one of the 2.5 mg tablets.    To take:  5 mg Take one of the 5 mg tablets.             Description          Take 2.5 mg x 6 days and 5 mg x 1 day. Recheck INR 17. Avelina Bourne RN    2017  2:35 PM          Anticoagulation Summary as of 2017     INR goal 2.0-3.0    Today's INR 4.1    Next INR check 2017          Call your Anticoagulation Clinic at Dept: [phone]   if:   1. Any medications are started, stopped, or there is a change in dose.  2. You experience any bleeding that is not easily stopped or if it is recurrent.  3. You notice an increase in bruising or any bruising that does not heal.  4. You are scheduled for surgery, colonoscopy, dental extraction or any other procedure where you may need to stop your Coumadin (warfarin).

## 2018-01-04 NOTE — TELEPHONE ENCOUNTER
Patient Information     Patient Name MRN Sandie Fuentes [id number] Female 1955      Telephone Encounter by Perla Reyes RN at 2017 11:47 AM     Author:  Perla Reyes RN Service:  (none) Author Type:  (none)     Filed:  2017 12:16 PM Encounter Date:  2017 Status:  Signed     :  Perla Reyes RN (NURS- Registered Nurse)            Cholesterol    Office visit in the past 12 months.    Last visit with ANDREA ANDI was on: 2017 in [facility]  Next visit with ANDI MENDEZ is on: 2017 in [facility]  Next visit with [facility] is on: 2017 in [facility]    Lab testing requirements:  Lipids annually.  Repeat lipids 6-8 weeks after dosage or drug change.    Last Lipids:  Chol: No results found in past 5 years    .  TG:   No results found in past 5 years    .  HDL:   No results found in past 5 years    .  LDL:  No results found in past 5 years    .  LDL DIRECT:  No results found in past 5 years    .    Max refills 12 months from last office visit.      Diuretics (may be prescribed for edema)    Office visit in the past 12 months or per provider note.    Last visit with ANDI MENDEZ was on: 2017 in [facility]  Next visit with ANDREAANDI is on: 2017 in [facility]  Next visit with [facility] is on: 2017 in [facility]    Lab testing requirements:  Creatinine and Potassium annually, if ordering lab, order BMP.  CREATININE (mg/dL)    Date Value   2017 0.53 (L)     POTASSIUM (mmol/L)    Date Value   2017 4.3     BP Readings from Last 4 Encounters:    17 102/55   04/10/17 108/80   17 108/78   17 106/78         Review last provider visit note.  If BP reviewed and plan is noted, can refill.  Max refill for 12 months from last office visit or per provider note.    Proton Pump Inhibitors    Office visit  in the past 12 months or per provider note.    Last visit with ANDI MENDEZ was on: 04/04/2017 in [facility]  Next visit with ANDI MENDEZ is on: 05/01/2017 in [facility]  Next visit with Family Practice is on: 05/01/2017 in [facility]    Max refill for 12 months from last office visit or per provider note.    Prescription refilled per RN Medication Refill Policy.................... Perla Reyes ....................  4/24/2017   12:08 PM

## 2018-01-04 NOTE — TELEPHONE ENCOUNTER
Patient Information     Patient Name MRN Sandie Fuentes [id number] Female 1955      Telephone Encounter by David Cottrell MD at 2017  2:53 PM     Author:  David Cottrell MD Service:  (none) Author Type:  Physician     Filed:  2017  2:54 PM Encounter Date:  2017 Status:  Signed     :  David Cottrell MD (Physician)            The Corgard is for control of tachycardia.  David Cottrell MD ....................  2017   2:54 PM

## 2018-01-04 NOTE — TELEPHONE ENCOUNTER
Patient Information     Patient Name MRN Sandie Fuentes [id number] Female 1955      Telephone Encounter by Perla Reeys RN at 2017 12:08 PM     Author:  Perla Reyes RN Service:  (none) Author Type:  (none)     Filed:  2017 12:16 PM Encounter Date:  2017 Status:  Signed     :  Perla Reyes RN (NURS- Registered Nurse)            This is a Refill request from: [facility]  Name of Medication: Desipramine  Quantity requested: #90  Last fill date: 2016  Due for refill: Yes  Last visit with DAVID MENDEZ was on: 2017 in [facility]  PCP:  David Mendez MD  Controlled Substance Agreement:  Signed 17   Diagnosis r/t this medication request: Major Depressive Disorder     Name of Medication: Nortriptyline  Quantity requested: #60  Last fill date: 04/10/2017  Due for refill: No, however patient requests #180 (90 day supply)  Last visit with DAVID MENDEZ was on: 2017 in [facility]  PCP:  David Mendez MD  Controlled Substance Agreement:  Signed 17   Diagnosis r/t this medication request: Major depressive disorder    Unable to complete prescription refill per RN Medication Refill Policy.................... Perla Reyes ....................  2017   12:09 PM

## 2018-01-04 NOTE — PROGRESS NOTES
Patient Information     Patient Name MRN Sandie Fuentes [id number] Female 1955      Progress Notes by David Cottrell MD at 2017  1:15 PM     Author:  David Cottrell MD Service:  (none) Author Type:  Physician     Filed:  2017  8:48 AM Encounter Date:  2017 Status:  Signed     :  David Cottrell MD (Physician)            SUBJECTIVE:  62 y.o. female who presents for follow-up of her hospitalization for rhabdomyolysis. She had fallen and was unable to get up. She was treated with IV hydration and eventually recovered, regaining strength in her legs. She is now at [facility] undergoing rehabilitation and is improving considerably.    Her INR was low while in the hospital and warfarin was adjusted. Presently she is on 5 mg on  and 2.5 mg the other 6 days. She's feeling much better, intends to be at [facility] for another week or 2 obtaining rehabilitation, and then will be returning home. All other medications remain the same except that her pain medication was increased by 1 tablet.    Additional Review of Systems: see HPI:   No new symptoms otherwise    Past Medical History:     Diagnosis  Date     Anxiety disorder      Bilateral foot-drop     following rhabdomyolysis      C. difficile colitis 3/7/2015     C. difficile colitis 2015     Chronic diarrhea 2015    Dr Woods, GI [facility]      Chronic pain      DVT (deep venous thrombosis) (HC) 2011    bilateral; chronic anticoag      Fingers fractured 07    fractured right 5th finger      GERD (gastroesophageal reflux disease)      History of rhabdomyolysis 2015, 3/2016    [facility] at [facility] in [place]      Hx of ectopic pregnancy     x2      Leg ulcer, left (HC) 2010    began 2010; venous stasis ulcer; Dr Hayden      Lower extremity venous stasis     followed By Dr Hayden      Major depressive episode      Mood disorder (HC)      Vasculitis (HC) 2014    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 Max acetaminophen dose: 4000mg in 24 hrs.  0     ALPRAZolam (XANAX) 1 mg tablet Take 1 tablet by mouth 3 times daily if needed for Anxiety. 90 tablet 0     cholecalciferol (VITAMIN D) 1,000 unit capsule Take 1 capsule by mouth once daily. 30 capsule 11     cholestyramine-sucrose 4 G per scoop (QUESTRAN) 4 gram powder TAKE 4GRAMS BY MOUTH TWICE DAILY 1134 g 0     desipramine 100 mg tablet TAKE ONE TABLET BY MOUTH EVERY DAY 90 tablet 0     dextroamphetamine (DEXEDRINE) 5 mg tablet Take 2 tablets by mouth three times daily at 8 AM, noon, and 4 PM. 60 tablet 0     diphenoxylate-atropine, 2.5-0.025 mg, (LOMOTIL) 2.5-0.025 mg tablet TAKE 1 TABLET BY MOUTH 4 TIMES DAILY IF NEEDED FOR DIARRHEA. 60 tablet 5     gabapentin (NEURONTIN) 300 mg capsule Take 3 capsules by mouth 3 times daily. 270 capsule 11     hydroCHLOROthiazide (HCTZ) 25 mg tablet TAKE 12.5 TO 25 MG BY MOUTH EVERY DAY AS NEEDED 90 tablet 0     loperamide (IMODIUM) 2 mg capsule Take by mouth as needed for diarrhea. Take 4mg by mouth with 1st loose stool, then 2mg with each subsequent loose stool. Max 16 mg in 24 hrs.       magnesium oxide (MAG-) 400 mg tablet Take 1 tablet by mouth 2 times daily. 180 tablet 4     multivitamin (MVI) tablet Take 1 tablet by mouth once daily. 60 tablet 0     nadolol (CORGARD) 40 mg tablet Take 1 tablet by mouth once daily.  0     nortriptyline 50 mg capsule TAKE 2 CAPSULES BY MOUTH AT BEDTIME 180 capsule 0     oxyCODONE 20 mg tab Take 1 tablet by mouth every 6 hours 40 tablet 0     pantoprazole (PROTONIX) 40 mg delayed-release tablet TAKE 1 TABLET BY MOUTH EVERY DAY 90 tablet 0     POLYVINYL ALCOHOL (NATURAL TEARS OPHT) Place 1-2 Drops into the eye(s) once daily if  needed (Dry Eyes).       PROBIOTIC 10 billion cell cap TK 1 C PO QD  0     warfarin (COUMADIN) 2.5 mg tablet Take 5 mg x 2 days and 2.5 mg x 5 days/week 100 tablet 4     No current facility-administered medications for this visit.      Medications have been reviewed by me and are current to the best of my knowledge and ability.      Allergies as of 05/01/2017 - Mack as Reviewed 05/01/2017      Allergen  Reaction Noted     Erythromycin Rash 12/13/2012     Sulfa (sulfonamide antibiotics) Rash 12/13/2012        OBJECTIVE:  /80  Pulse 100  Wt 68.5 kg (151 lb)  Breastfeeding? No  BMI 24.37 kg/m2  EXAM:  General Appearance: Pleasant, alert, appropriate appearance for age. No acute distress  Chest/Respiratory Exam: Normal chest wall and respirations. Clear to auscultation.  Cardiovascular Exam: Regular rate and rhythm. S1, S2, no murmur, click, gallop, or rubs.  Heart rate is 96  Musculoskeletal Exam: There is no tenderness in the right upper thigh compared to the left. The swelling of the upper thigh is decreased.  Foot Exam: Normal pulses. No edema. In turning of the right foot persists. We'll Unna boot has been replaced on the left.  Neurologic Exam: Intact and nonfocal  Psychiatric Exam: Alert and oriented, appropriate affect.  Basic metabolic panel is normal. Magnesium is still slightly low at 1.7. CK is normal at 112. INR is 1.6.    ASSESSMENT/PLAN:  Rhabdomyolysis resolved. No evidence of renal involvement.    History of DVT-INR remains low.    Tachycardia. Patient has been on Corgard for many years. She may benefit from a change to a more selective beta blocker such as metoprolol.    Plan: Patient was notified of lab results. We will see her back in follow-up when she is discharged from the nursing home. She will see Dr. Hayden next week to have her Unna boot removed and be [facility] as [facility].    Warfarin will be changed to 5 mg Monday Wednesday Friday and 2.5 mg the other 4 days and  follow-up again at the [facility] next week. Nursing home was notified of the warfarin change.  David Cottrell MD ....................  5/2/2017   8:47 AM

## 2018-01-04 NOTE — NURSING NOTE
Patient Information     Patient Name MRN Sandie Fuentes [id number] Female 1955      Nursing Note by Mary Medina at 2017  1:50 PM     Author:  Mary Medina Service:  (none) Author Type:  (none)     Filed:  2017 12:57 PM Encounter Date:  2017 Status:  Signed     :  Mary Medina            Patient is here today for a follow up on her wound care.

## 2018-01-04 NOTE — TELEPHONE ENCOUNTER
Patient Information     Patient Name MRN Sandie Fuentes [id number] Female 1955      Telephone Encounter by Perla Reyes RN at 4/10/2017  4:42 PM     Author:  Perla Reyes RN Service:  (none) Author Type:  (none)     Filed:  4/10/2017  4:44 PM Encounter Date:  4/10/2017 Status:  Signed     :  Perla Reyes RN (NURS- Registered Nurse)            This is a Refill request from: [facility]  Name of Medication: Nortriptyline  Quantity requested: #180  Last fill date: 2016  Due for refill: Yes  Last visit with DAVID MENDEZ was on: 2017 in [facility]  PCP:  David Mendez MD  Controlled Substance Agreement:  N/A   Diagnosis r/t this medication request: Major depressive episode    Patient has not been taking medication as prescribed. Was given #180 R-0 in July. To PCP for consideration.     Unable to complete prescription refill per RN Medication Refill Policy.................... Perla Reyes ....................  4/10/2017   4:42 PM

## 2018-01-04 NOTE — PATIENT INSTRUCTIONS
Patient Information     Patient Name MRN Sandie Fuentes [id number] Female 1955      Patient Instructions by Avelina Bourne RN at 2017  2:45 PM     Author:  Avelina Bourne RN Service:  (none) Author Type:  NURS- Registered Nurse     Filed:  2017  3:28 PM Encounter Date:  2017 Status:  Signed     :  Avelina Bourne RN (NURS- Registered Nurse)            2017 Details    Sun Mon Tue Wed Thu Fri Sat           1                 2               3               4      5 mg   See details      5      2.5 mg         6      2.5 mg         7      2.5 mg         8      2.5 mg           9      2.5 mg         10      2.5 mg         11      5 mg         12      2.5 mg         13      2.5 mg         14      2.5 mg         15      2.5 mg           16      2.5 mg         17      2.5 mg         18      5 mg         19               20               21               22                 23               24               25               26               27               28               29                 30                      Date Details    This INR check       Date of next INR:  2017         How to take your warfarin dose     To take:  2.5 mg Take one of the 2.5 mg tablets.    To take:  5 mg Take one of the 5 mg tablets.             Description          Take 5 mg x 1 days and 2.5 mg x 6 days recheck in 2 week.  Avelina Bourne RN    2017  3:26 PM             Anticoagulation Summary as of 2017     INR goal 2.0-3.0    Today's INR 3.2    Next INR check 2017          Call your Anticoagulation Clinic at Dept: [phone]   if:   1. Any medications are started, stopped, or there is a change in dose.  2. You experience any bleeding that is not easily stopped or if it is recurrent.  3. You notice an increase in bruising or any bruising that does not heal.  4. You are scheduled for surgery, colonoscopy, dental extraction or any other procedure where you may need to stop  your Coumadin (warfarin).

## 2018-01-04 NOTE — NURSING NOTE
Patient Information     Patient Name MRN Sex Sandie Kearns [id number] Female 1955      Nursing Note by Lexi Jewell at 2017  1:40 PM     Author:  Lexi Jewell Service:  (none) Author Type:  (none)     Filed:  2017  1:13 PM Encounter Date:  2017 Status:  Signed     :  Lexi Jewell            Patient comes in for left lower leg ulcer.  Lexi Jewell LPN ....................  2017   1:13 PM

## 2018-01-04 NOTE — PROGRESS NOTES
Patient Information     Patient Name MRN Sandie Fuentes [id number] Female 1955      Progress Notes by Telly Hayden MD at 2017  1:50 PM     Author:  Telly Hayden MD Service:  (none) Author Type:  Physician     Filed:  2017  1:50 PM Encounter Date:  2017 Status:  Signed     :  Telly Hayden MD (Physician)            Subjective:  This is a 62 y.o. female patient with venous stasis ulcer on the left lower extremity here for unna boot change.    Objective: /74  Pulse (!) 118  Wt 68.5 kg (151 lb)  BMI 24.37 kg/m2    The Unnaboot was removed and the leg cleansed with Hibiclens. The ulcer is epithelializing. It is 2.5 x 3 cm. Promogran Ana applied  The Unna boot was replaced.    Assessment:   Venous stasis ulcer LLE      Plan:  Follow up in one week for Unna boot change.      Telly Hayden MD FACS

## 2018-01-04 NOTE — PATIENT INSTRUCTIONS
Patient Information     Patient Name MRN Sandie Fuentes [id number] Female 1955      Patient Instructions by Avelina Bourne RN at 2017  4:19 PM     Author:  Avelina Bourne RN Service:  (none) Author Type:  NURS- Registered Nurse     Filed:  2017  4:25 PM Encounter Date:  2017 Status:  Signed     :  Avelina Bourne RN (NURS- Registered Nurse)            May 2017 Details    Sun  Sat      1      5 mg   See details      2      2.5 mg         3      5 mg         4      2.5 mg         5      2.5 mg         6                 7               8               9               10               11               12               13                 14               15               16               17               18               19               20                 21               22               23               24               25               26               27                 28               29               30               31                   Date Details    This INR check       Date of next INR:  2017         How to take your warfarin dose     To take:  2.5 mg Take one of the 2.5 mg tablets.    To take:  5 mg Take one of the 5 mg tablets.             Description          Take 5 mg x 2 days and 2.5 mg x 2 days recheck 17. Avelina Bourne RN    2017  4:24 PM          Anticoagulation Summary as of 2017     INR goal 2.0-3.0    Today's INR 1.6!    Next INR check 2017          Call your Anticoagulation Clinic at Dept: [phone]   if:   1. Any medications are started, stopped, or there is a change in dose.  2. You experience any bleeding that is not easily stopped or if it is recurrent.  3. You notice an increase in bruising or any bruising that does not heal.  4. You are scheduled for surgery, colonoscopy, dental extraction or any other procedure where you may need to stop your Coumadin (warfarin).  Patient not here, [employer]  Communication sheet with screening questions and current INR received via FAX from outside agency. Results reviewed, Warfarin dosing per protocol, and recommended follow-up appointment made. Paperwork FAXED back to facility.

## 2018-01-04 NOTE — NURSING NOTE
Patient Information     Patient Name MRN Sex Sandie Kearns [id number] Female 1955      Nursing Note by Payal Klein at 2017  1:50 PM     Author:  Payal Klein Service:  (none) Author Type:  (none)     Filed:  2017  1:23 PM Encounter Date:  2017 Status:  Signed     :  Payal Klein            Here today to follow up with wound care and unna boot change.  Payal Klein LPN..........2017  1:19 PM

## 2018-01-04 NOTE — PROGRESS NOTES
Patient Information     Patient Name MRN Sex Sandie Kearns [id number] Female 1955      Progress Notes by Telly Hayden MD at 2017  1:40 PM     Author:  Telly Hayden MD Service:  (none) Author Type:  Physician     Filed:  2017  1:34 PM Encounter Date:  2017 Status:  Signed     :  Telly Hayden MD (Physician)            Subjective:  This is a follow up of a patient with a history of left lower extremity venous stasis ulcer . The patient has  new recurrent ulcer. She reports she just noticed this recently.  The area is tender.      Objective: /78  Temp 98.6  F (37  C) (Tympanic)  Wt 62.6 kg (138 lb)  BMI 22.27 kg/m2  The wound on the left lower extremity is 3.4 x 4.4 cm. It is surrounded by erythema and hyperemia which is tender to palpation. Cultures obtained from the wound base.     Assessment:    Recurrent venous stasis ulcer left lower extremity with cellulitis    Plan:  Cipro  wound culture  Normal saline wet-to-dry dressing twice daily and follow up next week.

## 2018-01-04 NOTE — TELEPHONE ENCOUNTER
Patient Information     Patient Name MRN Sandie Fuentes [id number] Female 1955      Telephone Encounter by Perla Reyes RN at 3/28/2017  4:04 PM     Author:  Perla Reyes RN Service:  (none) Author Type:  (none)     Filed:  3/28/2017  4:08 PM Encounter Date:  3/28/2017 Status:  Signed     :  Perla Reyes RN (NURS- Registered Nurse)            This is a Refill request from: [facility]  Name of Medication: Lomotil  Quantity requested: #60  Last fill date: 2017  Due for refill: Yes  Last visit with DAVID MENDEZ was on: 2017 in [facility]  PCP:  David Mendez MD  Controlled Substance Agreement:  Signed 3/25/2016   Diagnosis r/t this medication request: Dyspeptic diarrhea, chronic diarrhea     Unable to complete prescription refill per RN Medication Refill Policy.................... Perla Reyes ....................  3/28/2017   4:05 PM

## 2018-01-04 NOTE — NURSING NOTE
Patient Information     Patient Name MRN Sandie Fuentes [id number] Female 1955      Nursing Note by Ruby Gupta at 2017  1:15 PM     Author:  Ruby Gupta Service:  (none) Author Type:  (none)     Filed:  2017  1:33 PM Encounter Date:  2017 Status:  Signed     :  Ruby Gupta            Patient presents to the clinic today for a follow up.    Ruby Gupta ....................  2017   1:31 PM

## 2018-01-04 NOTE — PROGRESS NOTES
Patient Information     Patient Name MRN Sandie Fuentes [id number] Female 1955      Progress Notes by David Cottrell MD at 2017  1:30 PM     Author:  David Cottrell MD Service:  (none) Author Type:  Physician     Filed:  2017  6:04 PM Encounter Date:  2017 Status:  Signed     :  David Cottrell MD (Physician)            CHRONIC PAIN MANAGEMENT: Presents for medication refill. She is due on  which is [place], so she will fill them a few days early. She takes 7 oxycodone 10 mg daily. These are controlling symptoms fairly well. The wound on her leg is opened up again, and she will be seeing Dr. Hayden. She is otherwise getting along reasonably well.    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 Yes, for the most part    Any adverse effects? No    Any adjuvant therapies? None recent other than bracing of her legs    Any aberrant drug taking behavior/abnormal affect? No    PAIN CLINIC EVALUATION:(NO)  DATE: She was seen at the [facility] for her diagnosis    PAIN MEDICATION AGREEMENT: (YES)   DATE SIGNED: Updated today    NON-MEDICATION MODALITIES MAXIMIZED? (YES)  physical therapy and bracing as well as Unna boot for her open ulcers    NEUROPATHIC PAIN: (YES)  ON GABAPENTIN/LYRICA/TCA/SNRI: (YES)    NOCICEPTIVE PAIN: (YES)    HISTORY OF CD: (NO)    MENTAL HEALTH DIAGNOSIS: (YES)  DIAGNOSIS: Depression and anxiety    ON BENZODIAZEPINES: (YES)  DISCLOSURE REGARDING RISK OF CONCOMITANT USE WITH OPIOIDS DISCUSSED: (YES)  DATE DISCUSSED: Today in any physical. She has been very stable on her current meds    MEDICATION: Oxycodone 10 mg-7 tablets daily    ORAL MORPHINE EQUIVALENTS: 105    LAST TAPER TRIAL: Within the last few months     QUERY  TODAY: (YES)  APPROPRIATE?:         (YES)    TOXASSURE TODAY: (NO)  IF NO, DATE OF LAST TOXASURE: June 2016      No flowsheet data found.      No flowsheet data found.    Physical Exam:       General: Pleasant and cooperative in no apparent distress.     Musculoskeletal: No new findings. Bilateral foot drop presently being braced.     Psychiatric: Alert and oriented with normal mood and affect     Other: Photographs of her open ulcers on the leg were reviewed    Assessment/plan: Chronic pain-fairly well controlled with current meds. We'll continue the same dosage for another month and it can be refilled April 14 because of the holiday. Follow-up again in mid May. Suggested she consider seeing a rheumatologist to see if it is possible that biologic agents may be an official for her particular diagnosis. Also encouraged her to have a mammogram done which she will consider having done elsewhere. She plans to see Dr. Hayden to have her wound reevaluated.  David Cottrell MD ....................  4/4/2017   6:04 PM

## 2018-01-04 NOTE — ADDENDUM NOTE
Patient Information     Patient Name MRN Sandie Fuentes [id number] Female 1955      Addendum Note by Andi Mendez MD at 2017  2:55 PM     Author:  Andi Mendez MD Service:  (none) Author Type:  Physician     Filed:  2017  2:55 PM Encounter Date:  2017 Status:  Signed     :  Andi Mendez MD (Physician)       Addended by: ANDI MENDEZ on: 2017 02:55 PM        Modules accepted: Orders

## 2018-01-04 NOTE — TELEPHONE ENCOUNTER
Patient Information     Patient Name MRN Sandie Fuentes [id number] Female 1955      Telephone Encounter by Ruby Gupta at 2017  2:56 PM     Author:  Ruby Gupta Service:  (none) Author Type:  (none)     Filed:  2017  2:56 PM Encounter Date:  2017 Status:  Signed     :  Ruby Gupta            See previous note, Abdullahi notified of prescription. It will be faxed.  Ruby Gupta ....................  2017   2:56 PM

## 2018-01-05 NOTE — PATIENT INSTRUCTIONS
Patient Information     Patient Name MRN Sandie Fuentes [id number] Female 1955      Patient Instructions by Avelina Bourne RN at 2017 12:29 PM     Author:  Avelina Bourne RN Service:  (none) Author Type:  NURS- Registered Nurse     Filed:  2017 12:32 PM Encounter Date:  2017 Status:  Signed     :  Avelina Bourne RN (NURS- Registered Nurse)            May 2017 Details    Sun  Thu Fri Sat      1               2               3               4               5               6                 7               8               9               10               11               12               13                 14               15               16               17               18      2.5 mg   See details      19      2.5 mg         20      2.5 mg           21      2.5 mg         22      2.5 mg         23      2.5 mg         24               25               26               27                 28               29               30               31                   Date Details    This INR check       Date of next INR:  2017         How to take your warfarin dose     To take:  2.5 mg Take one of the 2.5 mg tablets.             Description          Hold x 2 days and recheck on 17. Avelina Bourne RN    2017  10:48 AM            Anticoagulation Summary as of 2017     INR goal 2.0-3.0    Today's INR 2.1    Next INR check 2017          Call your Anticoagulation Clinic at Dept: [phone]   if:   1. Any medications are started, stopped, or there is a change in dose.  2. You experience any bleeding that is not easily stopped or if it is recurrent.  3. You notice an increase in bruising or any bruising that does not heal.  4. You are scheduled for surgery, colonoscopy, dental extraction or any other procedure where you may need to stop your Coumadin (warfarin).  Patient not here, [employer] Communication sheet with screening questions and  current INR received via FAX from outside agency. Results reviewed, Warfarin dosing per protocol, and recommended follow-up appointment made. Paperwork FAXED back to facility.

## 2018-01-05 NOTE — PATIENT INSTRUCTIONS
Patient Information     Patient Name MRN Sandie Fuentes [id number] Female 1955      Patient Instructions by Avelina Bourne RN at 2017 11:00 AM     Author:  Avelina Bourne RN Service:  (none) Author Type:  NURS- Registered Nurse     Filed:  2017 11:04 AM Encounter Date:  2017 Status:  Signed     :  Avelina Bourne RN (NURS- Registered Nurse)            May 2017 Details    Sun  Fri Sat      1               2               3               4               5               6                 7               8               9               10               11               12               13                 14               15               16               17               18               19               20                 21               22               23      Hold   See details      24      1.25 mg         25      2.5 mg         26      2.5 mg         27      2.5 mg           28      2.5 mg         29      2.5 mg         30      2.5 mg         31                   Date Details    This INR check       Date of next INR:  2017         How to take your warfarin dose     To take:  1.25 mg Take half of a 2.5 mgtablet.    To take:  2.5 mg Take one of the 2.5 mg tablets.    Hold Do not take your warfarin dose. The details table to the right may include additional information.                  Description          Hold x 1 days then take 1.25 mg x 1 day and 2.5 mg x 5 days and recheck 1 week. Avelina Bourne RN    2017  11:03 AM          Anticoagulation Summary as of 2017     INR goal 2.0-3.0    Today's INR 4.5!    Next INR check 2017          Call your Anticoagulation Clinic at Dept: [phone]   if:   1. Any medications are started, stopped, or there is a change in dose.  2. You experience any bleeding that is not easily stopped or if it is recurrent.  3. You notice an increase in bruising or any bruising that does not heal.  4. You  are scheduled for surgery, colonoscopy, dental extraction or any other procedure where you may need to stop your Coumadin (warfarin).  Patient not here, [employer] Communication sheet with screening questions and current INR received via FAX from outside agency. Results reviewed, Warfarin dosing per protocol, and recommended follow-up appointment made. Paperwork FAXED back to facility.

## 2018-01-05 NOTE — NURSING NOTE
Patient Information     Patient Name MRN Sex Sandie Kearns [id number] Female 1955      Nursing Note by Yani Champion LPN at 2017  1:00 PM     Author:  Yani Champion LPN Service:  (none) Author Type:  NURS- Licensed Practical Nurse     Filed:  2017  1:23 PM Encounter Date:  2017 Status:  Signed     :  Yani Champion LPN (NURS- Licensed Practical Nurse)            Sandie Stuart is a 62 y.o. female here today for wound care of the left leg. Declines mychart.  Yani CHRISTIAN. NAIN Champion.........2017   12:42 PM

## 2018-01-05 NOTE — NURSING NOTE
Patient Information     Patient Name MRN Sex Sandie Kearns [id number] Female 1955      Nursing Note by Payal Klein at 2017  2:40 PM     Author:  Payal Kleni Service:  (none) Author Type:  (none)     Filed:  2017  1:13 PM Encounter Date:  2017 Status:  Signed     :  Payal Klein            Here today to follow up with yony faye.  Payal Klein LPN..........2017  1:10 PM

## 2018-01-05 NOTE — PROGRESS NOTES
Patient Information     Patient Name MRN Sex Sandie Kearns [id number] Female 1955      Progress Notes by Telly Hayden MD at 5/15/2017  3:40 PM     Author:  Telly Hayden MD Service:  (none) Author Type:  Physician     Filed:  5/15/2017  1:34 PM Encounter Date:  5/15/2017 Status:  Signed     :  Telly Hayden MD (Physician)            Subjective:  This is a 62 y.o. female patient with venous stasis ulcer on the left lower extremity here for unna boot change.    Objective: /70  Pulse 92  Resp 20  Breastfeeding? No    The Unnaboot was removed and the leg cleansed with Hibiclens. The ulcer is now crescent shaped and 4 x 0.5 cm . Promogran Ana placed.  The Unna boot was replaced.    Assessment:   Venous stasis ulcer LLE      Plan:  Follow up in one week for Unna boot change.      Telly Hayden MD FACS

## 2018-01-05 NOTE — NURSING NOTE
Patient Information     Patient Name MRN Sandie Fuentes [id number] Female 1955      Nursing Note by Mary Medina at 5/15/2017  3:40 PM     Author:  Mary Medina Service:  (none) Author Type:  (none)     Filed:  5/15/2017  1:21 PM Encounter Date:  5/15/2017 Status:  Signed     :  Mary Medina            Patient is here today for a follow up on her wound care.  Mary Medina LPN.......................... 5/15/2017  1:03 PM

## 2018-01-05 NOTE — PROGRESS NOTES
Patient Information     Patient Name MRN Sex Sandie Kearns [id number] Female 1955      Progress Notes by Gabi Benjamin NP at 2017  1:00 PM     Author:  Gabi Benjamin NP Service:  (none) Author Type:  PHYS- Nurse Practitioner     Filed:  2017  1:23 PM Encounter Date:  2017 Status:  Signed     :  Gabi Benjamin NP (PHYS- Nurse Practitioner)            SUBJECTIVE:    Sandie Stuart is a 62 y.o. female who presents for left lower extremity venous stasis ulcer    HPI  she has chronic venous stasis ulcer of the left lower extremity. This patient is familiar to me. She has been seeing Dr. Hayden once weekly for management. He has been up applying collagen dressing and Unna boot. She states this is healing. There is been no drainage to the bandage. Afebrile denies pain.  Allergies     Allergen  Reactions     Erythromycin Rash     Sulfa (Sulfonamide Antibiotics) Rash   ,   Current Outpatient Prescriptions on File Prior to Visit       Medication  Sig Dispense Refill     acetaminophen (TYLENOL EXTRA STRENGTH) 500 mg tablet Take 2 tablets by mouth 3 times daily. Max acetaminophen dose: 4000mg in 24 hrs.  0     ALPRAZolam (XANAX) 1 mg tablet Take 1 tablet by mouth 3 times daily if needed for Anxiety. 90 tablet 0     cholecalciferol (VITAMIN D) 1,000 unit capsule Take 1 capsule by mouth once daily. 30 capsule 11     cholestyramine-sucrose 4 G per scoop (QUESTRAN) 4 gram powder TAKE 4GRAMS BY MOUTH TWICE DAILY 1134 g 0     desipramine 100 mg tablet TAKE ONE TABLET BY MOUTH EVERY DAY 90 tablet 0     dextroamphetamine (DEXEDRINE) 5 mg tablet Take 2 tablets by mouth three times daily at 8 AM, noon, and 4 PM. 60 tablet 0     diphenoxylate-atropine, 2.5-0.025 mg, (LOMOTIL) 2.5-0.025 mg tablet TAKE 1 TABLET BY MOUTH 4 TIMES DAILY IF NEEDED FOR DIARRHEA. 60 tablet 5     gabapentin (NEURONTIN) 300 mg capsule Take 3 capsules by mouth 3 times daily. 270 capsule 11      hydroCHLOROthiazide (HCTZ) 25 mg tablet TAKE 12.5 TO 25 MG BY MOUTH EVERY DAY AS NEEDED 90 tablet 0     loperamide (IMODIUM) 2 mg capsule Take by mouth as needed for diarrhea. Take 4mg by mouth with 1st loose stool, then 2mg with each subsequent loose stool. Max 16 mg in 24 hrs.       magnesium oxide (MAG-) 400 mg tablet Take 1 tablet by mouth 2 times daily. 180 tablet 4     multivitamin (MVI) tablet Take 1 tablet by mouth once daily. 60 tablet 0     nadolol (CORGARD) 40 mg tablet Take 1 tablet by mouth once daily. 30 tablet 12     nortriptyline 50 mg capsule TAKE 2 CAPSULES BY MOUTH AT BEDTIME 180 capsule 0     oxyCODONE 20 mg tab Take 1 tablet by mouth every 6 hours 40 tablet 0     pantoprazole (PROTONIX) 40 mg delayed-release tablet TAKE 1 TABLET BY MOUTH EVERY DAY 90 tablet 0     POLYVINYL ALCOHOL (NATURAL TEARS OPHT) Place 1-2 Drops into the eye(s) once daily if needed (Dry Eyes).       PROBIOTIC 10 billion cell cap TK 1 C PO QD  0     warfarin (COUMADIN) 2.5 mg tablet Take 2.5 mg x 5 days and 1.25 mg x 2 days/week 100 tablet 4     No current facility-administered medications on file prior to visit.     and   Past Medical History:     Diagnosis  Date     Anxiety disorder      Bilateral foot-drop 2016    following rhabdomyolysis      C. difficile colitis 3/7/2015     C. difficile colitis 5/27/2015     Chronic diarrhea 9/17/2015    Dr Woods, [facility]      Chronic pain      DVT (deep venous thrombosis) (HC) 12/2011    bilateral; chronic anticoag      Fingers fractured 07/19/07    fractured right 5th finger      GERD (gastroesophageal reflux disease)      History of rhabdomyolysis 12/2015, 3/2016    Hosp X2 at [facility] in [place]      Hx of ectopic pregnancy     x2      Leg ulcer, left (HC) 06/2010    began 6/2010; venous stasis ulcer; Dr Hayden      Lower extremity venous stasis     followed By Dr Hayden      Major depressive episode      Mood disorder (HC)      Vasculitis (HC) 1/2014     Microangiopathic vasculitis diagnosed at the [facility] several years ago causing her ulcer in her leg        REVIEW OF SYSTEMS:  ROS  see history of present illness  OBJECTIVE:  /72  Pulse 88  Temp 97.7  F (36.5  C) (Tympanic)  Wt 65.3 kg (144 lb)  Breastfeeding? No  BMI 23.24 kg/m2    EXAM:   Physical Exam  pleasant female without acute distress. Affect normal. Alert and oriented ×4. Unna boot removed from the left leg. There is trace edema. Left leg ulcer now measures 1.5 x 0.7 cm. No surrounding erythema nor warmth. No maceration. Scant drainage. Left foot DP PT intact. Capillary refill less than 3 seconds. Wound is cleansed with saline wash, collagen applied and moistened. Unna boot ×1, Kerlix ×1, Coban ×1 applied  ASSESSMENT/PLAN:    ICD-10-CM    1. Leg ulcer, left, with fat layer exposed (HC) L97.922 IA APPLY UNNA BOOT   2. Lower extremity venous stasis I87.8         Plan:  Follow-up with Dr. Hayden in 1 week, return to clinic sooner with any problems

## 2018-01-05 NOTE — PROGRESS NOTES
Patient Information     Patient Name MRN Sandie Fuentes [id number] Female 1955      Progress Notes by Telly Hayden MD at 2017  2:40 PM     Author:  Telly Hayden MD Service:  (none) Author Type:  Physician     Filed:  2017  2:06 PM Encounter Date:  2017 Status:  Signed     :  Telly Hayden MD (Physician)            Subjective:  This is a 62 y.o. female patient with venous stasis ulcer on the left lower extremity here for unna boot change.    Objective: /80  Pulse 78  Wt 65.3 kg (144 lb)  BMI 23.24 kg/m2    The Unnaboot was removed and the leg cleansed with Hibiclens. The ulcer is completely epithelialized. The skin is very fragile though.  The Unna boot was replaced.    Assessment:   Venous stasis ulcer left lower extremity, now healed      Plan:  Follow up in one week for Unna boot change. If it remains healed can switch to compression stockings. (New prescription for 15-20 mm compression with white silk liners)  Telly Hayden MD FACS

## 2018-01-05 NOTE — NURSING NOTE
Patient Information     Patient Name MRN Sandie Fuentes [id number] Female 1955      Nursing Note by Ruby Gupta at 2017 11:15 AM     Author:  Ruby Gupta Service:  (none) Author Type:  (none)     Filed:  2017 11:44 AM Encounter Date:  2017 Status:  Signed     :  Ruby Gupta            Patient presents to the clinic today for a hospital follow up.  Ruby Gupta ....................  2017   11:21 AM

## 2018-01-05 NOTE — PATIENT INSTRUCTIONS
Patient Information     Patient Name MRN Sandie Fuentes [id number] Female 1955      Patient Instructions by Sandra Hinds RN at 2017  2:15 PM     Author:  Sandra Hinds RN Service:  (none) Author Type:  NURS- Registered Nurse     Filed:  2017  3:43 PM Encounter Date:  2017 Status:  Signed     :  Sandra Hinds RN (NURS- Registered Nurse)            May 2017 Details    Sun Mon Tue Wed Thu Fri Sat      1               2               3               4               5               6                 7               8               9               10               11               12               13                 14               15               16               17               18               19               20                 21               22               23               24               25               26               27                 28               29               30               31      Hold   See details          Date Details    This INR check               How to take your warfarin dose     Hold Do not take your warfarin dose. The details table to the right may include additional information.                2017 Details    Sun Mon Tue Wed Thu Fri Sat         1      Hold         2      2.5 mg         3                 4               5               6               7               8               9               10                 11               12               13               14               15               16               17                 18               19               20               21               22               23               24                 25               26               27               28               29               30                 Date Details   No additional details    Date of next INR:  2017         How to take your warfarin dose     To take:  2.5 mg Take one of the 2.5 mg tablets.     Hold Do not take your warfarin dose. The details table to the right may include additional information.                  Description          Hold for two days, per Dr. Cottrell and then recheck INR on 6/2/17. Sandra Hinds RN 5/31/2017 3:41 PM    Reviewed signs and symptoms of bleeding, including extreme caution against falling and hitting head. Patient advised to seek medical attention if any bleeding or injury occurs. Follow-up per protocol.          Anticoagulation Summary as of 5/31/2017     INR goal 2.0-3.0    Today's INR 8.8!    Next INR check 6/2/2017          Call your Anticoagulation Clinic at Dept: [phone]   if:   1. Any medications are started, stopped, or there is a change in dose.  2. You experience any bleeding that is not easily stopped or if it is recurrent.  3. You notice an increase in bruising or any bruising that does not heal.  4. You are scheduled for surgery, colonoscopy, dental extraction or any other procedure where you may need to stop your Coumadin (warfarin).

## 2018-01-05 NOTE — PROGRESS NOTES
Patient Information     Patient Name MRN Sandie Fuentes [id number] Female 1955      Progress Notes by David Cottrell MD at 2017 11:15 AM     Author:  David Cottrell MD Service:  (none) Author Type:  Physician     Filed:  2017 12:19 PM Encounter Date:  2017 Status:  Signed     :  David Cottrell MD (Physician)            SUBJECTIVE:  62 y.o. female who presents for follow-up of her discharge from the nursing home. She is doing better, but still having a lot of trouble with foot drop bilaterally. Her right foot now is intoeing and she has a hard time getting around even with a walker because she tends to catch it and trip. She plans to see the orthotic specialist and get a brace for it.    She has an appointment with Dr. Hayden this afternoon. She needs a refill of pain medications.    Medications otherwise remain the same. The pain in her ulcer area on the left foot is fairly well-controlled with her current meds. She's had no significant side effects that she's had no interactions with the benzodiazepine that she takes. She's been stable on both meds.    We had discussed adding hydroxyzine on a when necessary basis in the past. She is going to try this.    She states she had a Pap test 2 years ago in [place] that was normal. She is due for a mammogram and intends to get that done sometime this summer. She will have labs done at her follow-up visit.    Additional Review of Systems: see HPI:   No cardiopulmonary symptoms. No new neurologic symptoms. Depression has been quite stable, however she is somewhat distressed because her primary caregiver who helps her out, Dr. Leigh, is moving to the [facility]. He will still be here for another month or so.    Past Medical History:     Diagnosis  Date     Anxiety disorder      Bilateral foot-drop 2016    following rhabdomyolysis      C. difficile colitis 3/7/2015     C. difficile colitis 2015     Chronic diarrhea  9/17/2015    Dr Woods, [facility]      Chronic pain      DVT (deep venous thrombosis) (HC) 12/2011    bilateral; chronic anticoag      Fingers fractured 07/19/07    fractured right 5th finger      GERD (gastroesophageal reflux disease)      History of rhabdomyolysis 12/2015, 3/2016    Hosp X2 at [facility] in [place]      Hx of ectopic pregnancy     x2      Leg ulcer, left (HC) 06/2010    began 6/2010; venous stasis ulcer; Dr Hayden      Lower extremity venous stasis     followed By Dr Hayden      Major depressive episode      Mood disorder (HC)      Vasculitis (HC) 1/2014    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 Max acetaminophen dose: 4000mg in 24 hrs.  0     ALPRAZolam (XANAX) 1 mg tablet Take 1 tablet by mouth 3 times daily if needed for Anxiety. 90 tablet 4     cholecalciferol (VITAMIN D) 1,000 unit capsule Take 1 capsule by mouth once daily. 30 capsule 11     cholestyramine-sucrose 4 G per scoop (QUESTRAN) 4 gram powder TAKE 4GRAMS BY MOUTH TWICE DAILY 1134 g 0     desipramine 100 mg tablet TAKE ONE TABLET BY MOUTH EVERY DAY 90 tablet 0     dextroamphetamine (DEXEDRINE) 5 mg tablet Take 2 tablets by mouth three times daily at 8 AM, noon, and 4 PM. 60 tablet 0     diphenoxylate-atropine, 2.5-0.025 mg, (LOMOTIL) 2.5-0.025 mg tablet TAKE 1 TABLET BY MOUTH 4 TIMES DAILY IF NEEDED FOR DIARRHEA. 60 tablet 5     gabapentin (NEURONTIN) 300 mg capsule Take 3 capsules by mouth 3 times daily. 270 capsule 11     hydroCHLOROthiazide (HCTZ) 25 mg tablet TAKE 12.5 TO 25 MG BY MOUTH EVERY DAY AS NEEDED 90 tablet 0     hydrOXYzine pamoate (VISTARIL) 25 mg capsule Take 1-2 capsules by mouth every 6 hours if needed. 100 capsule 3     loperamide (IMODIUM) 2 mg capsule Take by mouth as  "needed for diarrhea. Take 4mg by mouth with 1st loose stool, then 2mg with each subsequent loose stool. Max 16 mg in 24 hrs.       magnesium oxide (MAG-) 400 mg tablet Take 1 tablet by mouth 2 times daily. 180 tablet 4     multivitamin (MVI) tablet Take 1 tablet by mouth once daily. 60 tablet 0     nadolol (CORGARD) 40 mg tablet Take 1 tablet by mouth once daily. 30 tablet 12     nortriptyline 50 mg capsule Take 2 capsules by mouth at bedtime. 180 capsule 3     oxyCODONE 10 mg tablet 2 tablets tid and one tablet at hs 210 tablet 0     pantoprazole (PROTONIX) 40 mg delayed-release tablet Take 1 tablet by mouth once daily. 90 tablet 3     POLYVINYL ALCOHOL (NATURAL TEARS OPHT) Place 1-2 Drops into the eye(s) once daily if needed (Dry Eyes).       PROBIOTIC 10 billion cell cap TK 1 C PO QD  0     warfarin (COUMADIN) 2.5 mg tablet Take 2.5 mg x 5 days and 1.25 mg x 2 days/week 100 tablet 4     No current facility-administered medications for this visit.      Medications have been reviewed by me and are current to the best of my knowledge and ability.      Allergies as of 05/31/2017 - Mack as Reviewed 05/31/2017      Allergen  Reaction Noted     Erythromycin Rash 12/13/2012     Sulfa (sulfonamide antibiotics) Rash 12/13/2012        OBJECTIVE:  /76  Pulse 84  Ht 1.676 m (5' 6\")  Wt 65.3 kg (144 lb)  Breastfeeding? No  BMI 23.24 kg/m2  EXAM:  General Appearance: Pleasant, alert, appropriate appearance for age. No acute distress  Foot Exam: Right foot is intoeing somewhat.  Psychiatric Exam: Alert and oriented, appropriate affect.  Does become somewhat anxious when discussing the movers of Dr. Leigh      ASSESSMENT/PLAN:  Chronic pain-due for med refills now.  was reviewed. Toxicology screen done in the ED was appropriate-it did show methamphetamine which is across reaction with her Dexedrine. She has cut back on the Dexedrine now. Oxycodone is refilled for another month. They're controlled substance " contract was done in April. We'll see her back in follow-up in one month and do lab at that time to include a CBC, metabolic panel, magnesium level and lipid profile. Vistaril was added. Follow-up in one month. She sees Dr. Hayden this afternoon and also has a visit at the [facility] time clinic. We also discussed getting a mammogram done before she moves to [place], which she hopes to accomplish within the next few months. In addition she will contact the  that she's had interactions with previously to get some help at home.  David Cottrell MD ....................  5/31/2017   12:18 PM

## 2018-01-05 NOTE — PATIENT INSTRUCTIONS
Patient Information     Patient Name MRN Sandie Fuentes [id number] Female 1955      Patient Instructions by Avelina Bourne RN at 2017 10:11 AM     Author:  Avelina Bourne RN Service:  (none) Author Type:  NURS- Registered Nurse     Filed:  2017 10:49 AM Encounter Date:  2017 Status:  Signed     :  Avelina Bourne RN (NURS- Registered Nurse)            May 2017 Details    Sun  Wed Thu Fri Sat      1               2               3               4               5               6                 7               8               9               10               11               12               13                 14               15               16      Hold   See details      17      Hold         18      2.5 mg         19               20                 21               22               23               24               25               26               27                 28               29               30               31                   Date Details    This INR check       Date of next INR:  2017         How to take your warfarin dose     To take:  2.5 mg Take one of the 2.5 mg tablets.    Hold Do not take your warfarin dose. The details table to the right may include additional information.                  Description          Hold x 2 days and recheck on 17. Avelina Bourne RN    2017  10:48 AM            Anticoagulation Summary as of 2017     INR goal 2.0-3.0    Today's INR 5.4    Next INR check 2017          Call your Anticoagulation Clinic at Dept: [phone]   if:   1. Any medications are started, stopped, or there is a change in dose.  2. You experience any bleeding that is not easily stopped or if it is recurrent.  3. You notice an increase in bruising or any bruising that does not heal.  4. You are scheduled for surgery, colonoscopy, dental extraction or any other procedure where you may need to stop your Coumadin  (warfarin).  Patient not here, [facility] Communication sheet with screening questions and current INR received via FAX from outside agency. Results reviewed, Warfarin dosing per protocol, and recommended follow-up appointment made. Paperwork FAXED back to facility.

## 2018-01-15 ENCOUNTER — COMMUNICATION - GICH (OUTPATIENT)
Dept: FAMILY MEDICINE | Facility: OTHER | Age: 63
End: 2018-01-15

## 2018-01-25 ENCOUNTER — DOCUMENTATION ONLY (OUTPATIENT)
Dept: FAMILY MEDICINE | Facility: OTHER | Age: 63
End: 2018-01-25

## 2018-01-25 VITALS
BODY MASS INDEX: 23.24 KG/M2 | HEART RATE: 88 BPM | SYSTOLIC BLOOD PRESSURE: 132 MMHG | WEIGHT: 144 LBS | DIASTOLIC BLOOD PRESSURE: 72 MMHG | TEMPERATURE: 97.7 F

## 2018-01-25 VITALS
SYSTOLIC BLOOD PRESSURE: 122 MMHG | SYSTOLIC BLOOD PRESSURE: 100 MMHG | TEMPERATURE: 97.3 F | HEIGHT: 66 IN | HEART RATE: 84 BPM | HEIGHT: 66 IN | DIASTOLIC BLOOD PRESSURE: 70 MMHG | DIASTOLIC BLOOD PRESSURE: 70 MMHG | HEART RATE: 90 BPM

## 2018-01-25 VITALS
DIASTOLIC BLOOD PRESSURE: 78 MMHG | TEMPERATURE: 98.6 F | SYSTOLIC BLOOD PRESSURE: 122 MMHG | SYSTOLIC BLOOD PRESSURE: 108 MMHG | WEIGHT: 138 LBS | BODY MASS INDEX: 22.27 KG/M2 | DIASTOLIC BLOOD PRESSURE: 80 MMHG | RESPIRATION RATE: 20 BRPM | HEART RATE: 100 BPM

## 2018-01-25 VITALS — SYSTOLIC BLOOD PRESSURE: 106 MMHG | DIASTOLIC BLOOD PRESSURE: 66 MMHG | HEIGHT: 66 IN | HEART RATE: 104 BPM

## 2018-01-25 VITALS
HEART RATE: 100 BPM | WEIGHT: 142 LBS | HEART RATE: 84 BPM | HEIGHT: 66 IN | HEIGHT: 66 IN | HEART RATE: 72 BPM | HEIGHT: 66 IN | SYSTOLIC BLOOD PRESSURE: 128 MMHG | DIASTOLIC BLOOD PRESSURE: 80 MMHG | BODY MASS INDEX: 22.82 KG/M2 | WEIGHT: 153 LBS | DIASTOLIC BLOOD PRESSURE: 80 MMHG | SYSTOLIC BLOOD PRESSURE: 118 MMHG | SYSTOLIC BLOOD PRESSURE: 100 MMHG | DIASTOLIC BLOOD PRESSURE: 68 MMHG | BODY MASS INDEX: 24.59 KG/M2 | HEART RATE: 104 BPM | SYSTOLIC BLOOD PRESSURE: 102 MMHG | DIASTOLIC BLOOD PRESSURE: 62 MMHG

## 2018-01-25 VITALS
DIASTOLIC BLOOD PRESSURE: 80 MMHG | HEIGHT: 66 IN | SYSTOLIC BLOOD PRESSURE: 124 MMHG | BODY MASS INDEX: 22.82 KG/M2 | HEART RATE: 100 BPM | DIASTOLIC BLOOD PRESSURE: 80 MMHG | WEIGHT: 142 LBS | BODY MASS INDEX: 23.24 KG/M2 | HEIGHT: 66 IN | DIASTOLIC BLOOD PRESSURE: 80 MMHG | DIASTOLIC BLOOD PRESSURE: 60 MMHG | WEIGHT: 146 LBS | WEIGHT: 144 LBS | HEART RATE: 78 BPM | BODY MASS INDEX: 23.46 KG/M2 | SYSTOLIC BLOOD PRESSURE: 122 MMHG | RESPIRATION RATE: 20 BRPM | SYSTOLIC BLOOD PRESSURE: 120 MMHG | SYSTOLIC BLOOD PRESSURE: 108 MMHG | HEART RATE: 80 BPM | HEIGHT: 66 IN

## 2018-01-25 VITALS — DIASTOLIC BLOOD PRESSURE: 82 MMHG | SYSTOLIC BLOOD PRESSURE: 110 MMHG | HEART RATE: 60 BPM | HEIGHT: 66 IN

## 2018-01-25 VITALS
HEART RATE: 100 BPM | DIASTOLIC BLOOD PRESSURE: 80 MMHG | SYSTOLIC BLOOD PRESSURE: 118 MMHG | BODY MASS INDEX: 24.37 KG/M2 | DIASTOLIC BLOOD PRESSURE: 86 MMHG | HEIGHT: 66 IN | WEIGHT: 151 LBS | HEART RATE: 112 BPM | SYSTOLIC BLOOD PRESSURE: 128 MMHG

## 2018-01-25 VITALS
OXYGEN SATURATION: 99 % | HEART RATE: 74 BPM | TEMPERATURE: 97.9 F | SYSTOLIC BLOOD PRESSURE: 108 MMHG | WEIGHT: 153 LBS | BODY MASS INDEX: 22.27 KG/M2 | WEIGHT: 138 LBS | HEIGHT: 66 IN | BODY MASS INDEX: 22.02 KG/M2 | HEIGHT: 66 IN | DIASTOLIC BLOOD PRESSURE: 80 MMHG | SYSTOLIC BLOOD PRESSURE: 118 MMHG | WEIGHT: 137 LBS | DIASTOLIC BLOOD PRESSURE: 76 MMHG | HEART RATE: 76 BPM | HEART RATE: 62 BPM | BODY MASS INDEX: 24.59 KG/M2 | DIASTOLIC BLOOD PRESSURE: 80 MMHG | SYSTOLIC BLOOD PRESSURE: 124 MMHG

## 2018-01-25 VITALS
BODY MASS INDEX: 23.16 KG/M2 | WEIGHT: 142 LBS | SYSTOLIC BLOOD PRESSURE: 106 MMHG | HEIGHT: 66 IN | DIASTOLIC BLOOD PRESSURE: 58 MMHG | DIASTOLIC BLOOD PRESSURE: 74 MMHG | BODY MASS INDEX: 22.82 KG/M2 | HEART RATE: 118 BPM | WEIGHT: 143.5 LBS | SYSTOLIC BLOOD PRESSURE: 110 MMHG | HEART RATE: 84 BPM

## 2018-01-25 VITALS
WEIGHT: 144 LBS | HEIGHT: 66 IN | HEART RATE: 84 BPM | SYSTOLIC BLOOD PRESSURE: 118 MMHG | HEIGHT: 66 IN | SYSTOLIC BLOOD PRESSURE: 120 MMHG | BODY MASS INDEX: 23.46 KG/M2 | SYSTOLIC BLOOD PRESSURE: 106 MMHG | WEIGHT: 146 LBS | RESPIRATION RATE: 20 BRPM | DIASTOLIC BLOOD PRESSURE: 76 MMHG | BODY MASS INDEX: 23.14 KG/M2 | DIASTOLIC BLOOD PRESSURE: 70 MMHG | HEART RATE: 92 BPM | DIASTOLIC BLOOD PRESSURE: 78 MMHG

## 2018-01-25 VITALS
HEART RATE: 100 BPM | DIASTOLIC BLOOD PRESSURE: 74 MMHG | SYSTOLIC BLOOD PRESSURE: 118 MMHG | BODY MASS INDEX: 23.57 KG/M2 | WEIGHT: 146 LBS

## 2018-01-25 PROBLEM — I47.9 PAROXYSMAL TACHYCARDIA (H): Status: ACTIVE | Noted: 2017-06-03

## 2018-01-25 RX ORDER — NADOLOL 20 MG/1
1 TABLET ORAL AT BEDTIME
COMMUNITY
Start: 2017-06-27

## 2018-01-25 RX ORDER — DESIPRAMINE HYDROCHLORIDE 100 MG/1
100 TABLET ORAL DAILY
COMMUNITY
Start: 2017-10-11

## 2018-01-25 RX ORDER — LOPERAMIDE HCL 2 MG
2 CAPSULE ORAL 4 TIMES DAILY PRN
COMMUNITY
Start: 2017-09-08

## 2018-01-25 RX ORDER — HYDROMORPHONE HYDROCHLORIDE 2 MG/1
1-2 TABLET ORAL
COMMUNITY
Start: 2017-09-18

## 2018-01-25 RX ORDER — WHEELCHAIR
EACH MISCELLANEOUS
COMMUNITY
Start: 2017-08-07

## 2018-01-25 RX ORDER — MAGNESIUM OXIDE 400 MG/1
400 TABLET ORAL 2 TIMES DAILY
COMMUNITY
Start: 2017-04-23

## 2018-01-25 RX ORDER — WARFARIN SODIUM 2.5 MG/1
TABLET ORAL
COMMUNITY
Start: 2017-07-05 | End: 2018-04-11

## 2018-01-25 RX ORDER — HYDROXYZINE PAMOATE 25 MG/1
25-50 CAPSULE ORAL EVERY 6 HOURS PRN
COMMUNITY
Start: 2017-09-08

## 2018-01-25 RX ORDER — WARFARIN SODIUM 1 MG/1
TABLET ORAL
COMMUNITY
Start: 2017-09-18

## 2018-01-25 RX ORDER — NORTRIPTYLINE HYDROCHLORIDE 50 MG/1
100 CAPSULE ORAL AT BEDTIME
COMMUNITY
Start: 2017-05-31 | End: 2018-04-11

## 2018-01-25 RX ORDER — ALPRAZOLAM 1 MG
1 TABLET ORAL 3 TIMES DAILY PRN
COMMUNITY
Start: 2017-09-18

## 2018-01-25 RX ORDER — GABAPENTIN 300 MG/1
900 CAPSULE ORAL 3 TIMES DAILY
COMMUNITY
Start: 2016-12-05

## 2018-01-25 RX ORDER — CYCLOBENZAPRINE HCL 10 MG
10 TABLET ORAL 3 TIMES DAILY PRN
COMMUNITY
Start: 2017-09-08

## 2018-01-25 RX ORDER — DEXTROAMPHETAMINE SULFATE 5 MG/1
TABLET ORAL
COMMUNITY
Start: 2017-09-18

## 2018-01-25 RX ORDER — DIPHENOXYLATE HCL/ATROPINE 2.5-.025MG
1 TABLET ORAL 4 TIMES DAILY PRN
COMMUNITY
Start: 2017-09-08

## 2018-01-25 RX ORDER — ACETAMINOPHEN 500 MG
1000 TABLET ORAL EVERY 8 HOURS
COMMUNITY

## 2018-01-25 RX ORDER — PANTOPRAZOLE SODIUM 40 MG/1
40 TABLET, DELAYED RELEASE ORAL DAILY
COMMUNITY
Start: 2017-05-31

## 2018-01-27 ASSESSMENT — PATIENT HEALTH QUESTIONNAIRE - PHQ9
SUM OF ALL RESPONSES TO PHQ QUESTIONS 1-9: 7
SUM OF ALL RESPONSES TO PHQ QUESTIONS 1-9: 9
SUM OF ALL RESPONSES TO PHQ QUESTIONS 1-9: 0

## 2018-02-11 PROBLEM — I82.403 ACUTE EMBOLISM AND THROMBOSIS OF UNSPECIFIED DEEP VEINS OF LOWER EXTREMITY, BILATERAL: Status: ACTIVE | Noted: 2018-02-11

## 2018-02-12 NOTE — TELEPHONE ENCOUNTER
Patient Information     Patient Name MRN Sandie Fuentes [id number] Female 1955      Telephone Encounter by Sandra Kenny RN at 2017  2:23 PM     Author:  Sandra Kenny RN Service:  (none) Author Type:  NURS- Registered Nurse     Filed:  2017  2:29 PM Encounter Date:  2017 Status:  Signed     :  Sandra Kenny RN (NURS- Registered Nurse)            Patient called asking about xanax refill. Patient states pharmacy is telling her they do not have the Rx on file.   Patient is requesting we call [facility] in [place], [place]. Verbal order/refill count given to retail pharmacist there. Sandra Kenny RN .............. 2017  2:28 PM

## 2018-02-12 NOTE — TELEPHONE ENCOUNTER
Patient Information     Patient Name MRN Sandie Fuentes [id number] Female 1955      Telephone Encounter by Monisha Lawson RN at 2017 12:08 PM     Author:  Monisha Lawson RN Service:  (none) Author Type:  NURS- Registered Nurse     Filed:  2017 12:10 PM Encounter Date:  2017 Status:  Signed     :  Monisha Lawson RN (NURS- Registered Nurse)            Medication filled 2017 #90 R5  Unable to complete prescription refill per RN Medication Refill Policy.................... Monisha Lawson RN ....................  2017   12:09 PM

## 2018-02-12 NOTE — TELEPHONE ENCOUNTER
Patient Information     Patient Name MRN Sandie Fuentes [id number] Female 1955      Telephone Encounter by Renetta Jj at 1/15/2018  2:44 PM     Author:  Renetta Jj Service:  (none) Author Type:  (none)     Filed:  1/15/2018  2:45 PM Encounter Date:  1/15/2018 Status:  Signed     :  Renetta Jj-Pt is in [facility] for the winter. Called to apologize about missing her appt today and to let WLR know she has established care with pain med  in [place]. He will be sending you a letter also. Thank You.  Renetta Jj ....................  1/15/2018   2:45 PM

## 2018-04-11 DIAGNOSIS — F32.4 MAJOR DEPRESSIVE DISORDER, SINGLE EPISODE, IN PARTIAL REMISSION (H): ICD-10-CM

## 2018-04-11 DIAGNOSIS — Z79.01 ANTICOAGULATION MONITORING, SPECIAL RANGE: Primary | ICD-10-CM

## 2018-04-12 DIAGNOSIS — F32.4 MAJOR DEPRESSIVE DISORDER, SINGLE EPISODE, IN PARTIAL REMISSION (H): ICD-10-CM

## 2018-04-12 RX ORDER — WARFARIN SODIUM 2.5 MG/1
TABLET ORAL
Qty: 100 TABLET | Refills: 0 | Status: SHIPPED | OUTPATIENT
Start: 2018-04-12

## 2018-04-12 RX ORDER — NORTRIPTYLINE HYDROCHLORIDE 50 MG/1
CAPSULE ORAL
Qty: 60 CAPSULE | Refills: 0 | Status: SHIPPED | OUTPATIENT
Start: 2018-04-12 | End: 2018-04-12

## 2018-04-12 NOTE — TELEPHONE ENCOUNTER
I will fill these 1 time.  She needs to establish care in [place] and have her INR done.  ANDI MENDEZ MD on 4/12/2018 at 10:15 AM

## 2018-04-12 NOTE — TELEPHONE ENCOUNTER
Warfarin request:  Last INR 2.7:  09/18/17  Last office visit with Dr. Cottrell:  09/18/17  Last dosage on file is 3 mg daily.  Patient is in [place], and should have established care there for her anticoagulation management.  Attempted to call Sandie with no answer.  Will forward to Dr. Cottrell for consideration.

## 2018-04-12 NOTE — TELEPHONE ENCOUNTER
Called patient, no anser, left message for her to call the protime clinic so message can be relayed.

## 2018-04-17 RX ORDER — NORTRIPTYLINE HYDROCHLORIDE 50 MG/1
CAPSULE ORAL
Qty: 180 CAPSULE | Refills: 0 | Status: SHIPPED | OUTPATIENT
Start: 2018-04-17

## 2018-04-17 NOTE — TELEPHONE ENCOUNTER
[facility] pharmacy and pt request a Rx refill for nortriptyline (Pamelor).  Tricyclic agent protocol passed.  Pt's last exam appt with PCP Dr. MADELYN Cottrell was on 9-18-17.  Prescription approved per [facility] Refill Protocol.  ... Sandra Ziegler RN

## 2018-06-14 NOTE — TELEPHONE ENCOUNTER
Requested Prescriptions   Pending Prescriptions Disp Refills     ALPRAZolam (XANAX) 1 MG tablet [Pharmacy Med Name: ALPRAZOLAM 1MG TABLETS] 90 tablet 0     Sig: TAKE 1 TABLET BY MOUTH THREE TIMES DAILY AS NEEDED    There is no refill protocol information for this order        [facility] in [place]       Last Written Prescription Date:  05/13/2018  Last Fill Quantity: 90,   # refills: 0  Last Office Visit: 09/18/2017  Future Office visit:  None set up as of yet, [facility] in [place]     Routing refill request to provider for review/approval because:  Drug not on the [facility], [facility] or  Health refill protocol or controlled substance  To MD per RN refill protocol.  Giuliana Ann RN on 6/14/2018 at 1:35 PM

## 2018-06-15 RX ORDER — ALPRAZOLAM 1 MG
TABLET ORAL
Qty: 90 TABLET | Refills: 0 | OUTPATIENT
Start: 2018-06-15

## 2019-04-25 ENCOUNTER — DOCUMENTATION ONLY (OUTPATIENT)
Dept: OTHER | Facility: CLINIC | Age: 64
End: 2019-04-25

## 2024-03-11 NOTE — PROGRESS NOTES
Patient Information     Patient Name MRN Sandie Fuentes [id number] Female 1955      Progress Notes by David Cottrell MD at 2017 11:15 AM     Author:  David Cottrell MD  Service:  (none) Author Type:  Physician     Filed:  2017 12:25 PM  Encounter Date:  2017 Status:  Addendum     :  David Cottrell MD (Physician)        Related Notes: Original Note by David Cottrell MD (Physician) filed at 2017 12:24 PM            SUBJECTIVE:  62 y.o. female who presents for follow-up, as she is being discharged this afternoon from [facility]. She's been making good progress in PT and OT, being more active and having more pain in her legs. Pain control has been adequate. She will be due for pain med refills in 2 days. Her other meds remain the same.    She had an INR earlier this week that was 3.9. Her medication was adjusted and she was given a printout by me as to how to take her warfarin. She will alternate 1.25 was 2.5 every other day and then have it checked again next week.    She will be seeing Dr. Hayden next week. She does need a prescription for a brace for her right leg because of her foot drop and the fact that it inverts.    Her sisters are coming to help clean up her home, as she intends to move to [place] likely in the fall.    Medications remain the same otherwise. She says otherwise she feels fairly well.    Additional Review of Systems: see HPI:       Past Medical History:     Diagnosis  Date     Anxiety disorder      Bilateral foot-drop     following rhabdomyolysis      C. difficile colitis 3/7/2015     C. difficile colitis 2015     Chronic diarrhea 2015    Dr Woods, [facility]      Chronic pain      DVT (deep venous thrombosis) (HC) 2011    bilateral; chronic anticoag      Fingers fractured 07    fractured right 5th finger      GERD (gastroesophageal reflux disease)      History of rhabdomyolysis 2015, 3/2016     Subjective   Patient ID: Payal is a 44 year old female.    This visit is being performed virtually via Non-integrated Video Visit. Consent to treat includes permission to submit charges to the patient's insurance. It was shared that without being seen and evaluated in person, there is a risk that the information and/or assessment may be incomplete or inaccurate. This video visit may be discontinued by patient or clinician, if it is felt that the videoconferencing connections are not adequate for her situation.   Clinical Location:  [facility]  Payal's location  [facility]  and is physically present in   the [facility] at the time of this visit.      Chief Complaint   Patient presents with    Video Visit    Office Visit    Sinusitis     Sinus Problem  This is a new problem. Episode onset: 1 month ago. The problem occurs constantly. The problem has been waxing and waning. Associated symptoms include congestion, coughing, headaches and a sore throat. Pertinent negatives include no chills or fever. Associated symptoms comments: Lingering sore throat, sinus pain, body aches for the past month. Maxillary sinus pain. Has  dry coughing fits.. Nothing aggravates the symptoms. Treatments tried: brandon seltzer cold and sinus, zicam, mucinex. The treatment provided mild relief.         Past Medical History:   Diagnosis Date    Allergy     Anxiety     Bacterial sinusitis     Bilateral impacted cerumen     Depressive disorder     Pelvic congestion syndrome     Pharyngitis     Pharyngitis due to Streptococcus species     PONV (postoperative nausea and vomiting)     Seasonal allergies     Sore throat     Thyroid disease     URI, acute     Vaginitis     Varicose veins of both lower extremities        MEDICATIONS:  Current Outpatient Medications   Medication Sig    levothyroxine 112 MCG tablet Take 112 mcg by mouth daily.    amoxicillin-clavulanate (AUGMENTIN) 875-125 MG per tablet Take 1 tablet by mouth in the  [facility] in [place]      Hx of ectopic pregnancy     x2      Leg ulcer, left (HC) 06/2010    began 6/2010; venous stasis ulcer; Dr Hayden      Lower extremity venous stasis     followed By Dr Hayden      Major depressive episode      Mesenteric thrombosis (HC) 2015    Lifelong warfarin recommended by gastroenterology      Mood disorder (HC)      Paroxysmal tachycardia (HC)      QT prolongation 4/17/2017     Traumatic rhabdomyolysis (HC) 04/17/2017    hosp'd 7 days at 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 Max acetaminophen dose: 4000mg in 24 hrs.       ALPRAZolam (XANAX) 1 mg tablet Take 1 tablet by mouth 3 times daily if needed for Anxiety. 54 tablet 0     desipramine 100 mg tablet Take 1 tablet by mouth at bedtime.       dextroamphetamine (DEXEDRINE) 5 mg tablet Take 2 tablets by mouth three times daily at 8 AM, noon, and 4 PM. 54 tablet 0     diphenoxylate-atropine, 2.5-0.025 mg, (LOMOTIL) 2.5-0.025 mg tablet Take 1 tablet by mouth 4 times daily if needed for Diarrhea. 90 tablet 5     gabapentin (NEURONTIN) 300 mg capsule Take 3 capsules by mouth 3 times daily. 270 capsule 11     hydrOXYzine pamoate (VISTARIL) 25 mg capsule Take 1-2 capsules by mouth every 6 hours if needed. 100 capsule 3     L.ACID/L.CASEI/B.BIF/B.YUE/FOS (PROBIOTIC BLEND ORAL) Take 1 capsule by mouth once daily.       loperamide (IMODIUM) 2 mg capsule Take by mouth as needed for diarrhea. Take 4mg by mouth with 1st loose stool, then 2mg with each subsequent loose stool. Max 16 mg in 24 hrs.       magnesium oxide (MAG-) 400 mg tablet Take 1 tablet by mouth 2 times daily. 180 tablet 4     multivitamin (MVI) tablet Take 1 tablet by mouth once daily. 60 tablet 0  morning and 1 tablet in the evening. Do all this for 10 days.    cetirizine (ZyrTEC) 10 MG tablet Take 1 tablet by mouth daily.    ibuprofen (MOTRIN) 600 MG tablet Take 1 tablet by mouth every 8 hours as needed for Pain.    acetaminophen (TYLENOL) 325 MG tablet Take 2 tablets by mouth every 4 hours as needed for Pain.    Multiple Vitamin (MULTIVITAMIN ADULT PO) Take by mouth daily.    levothyroxine 100 MCG tablet Take by mouth daily.      No current facility-administered medications for this visit.       ALLERGIES:  ALLERGIES:  No Known Allergies    PAST SURGICAL HISTORY:  Past Surgical History:   Procedure Laterality Date    ------------other-------------      coils in pelvis    Breast enhancement surgery       section, classic       section, low transverse      Hernia repair      Pelvic artery embolization      Varicose vein surgery         FAMILY HISTORY:  Family History   Problem Relation Age of Onset    Hypertension Mother     Diabetes Maternal Grandfather        SOCIAL HISTORY:  Social History     Tobacco Use    Smoking status: Never     Passive exposure: Never    Smokeless tobacco: Never   Vaping Use    Vaping Use: never used   Substance Use Topics    Alcohol use: Not Currently     Comment: OCCASIONALLY    Drug use: No         Patient's medications, allergies, past medical, surgical, social and family histories were reviewed and updated as appropriate.  Patient's medications, allergies, past medical, surgical, and social history  were reviewed and updated as appropriate.    Review of Systems   Constitutional:  Negative for chills and fever.   HENT:  Positive for congestion, postnasal drip, sinus pain and sore throat. Negative for ear pain and trouble swallowing.    Respiratory:  Positive for cough. Negative for chest tightness, shortness of breath and wheezing.    Cardiovascular: Negative.    Neurological:  Positive for headaches.       Objective   Physical Exam  Vitals reviewed.    Constitutional:       General: She is not in acute distress.  HENT:      Head: Normocephalic.      Right Ear: Hearing, tympanic membrane and ear canal normal.      Left Ear: Hearing, tympanic membrane and ear canal normal.      Nose: Congestion present.      Right Turbinates: Swollen.      Left Turbinates: Swollen.      Right Sinus: Maxillary sinus tenderness present. No frontal sinus tenderness.      Left Sinus: Maxillary sinus tenderness present. No frontal sinus tenderness.      Mouth/Throat:      Mouth: Mucous membranes are moist.      Pharynx: Oropharynx is clear. Uvula midline. No posterior oropharyngeal erythema.   Cardiovascular:      Rate and Rhythm: Normal rate and regular rhythm.      Heart sounds: Normal heart sounds. No murmur heard.  Pulmonary:      Effort: Pulmonary effort is normal. No accessory muscle usage or retractions.      Breath sounds: Normal breath sounds.   Neurological:      Mental Status: She is alert.       Visit Vitals  /78   Pulse 77   Temp 97.4 °F (36.3 °C) (Temporal)   Resp 15   Ht 5' 6\" (1.676 m)   Wt 66.2 kg (145 lb 13.4 oz)   LMP 02/26/2024 (Exact Date)   SpO2 100%   BMI 23.54 kg/m²       Assessment   Problem List Items Addressed This Visit    None  Visit Diagnoses       Acute non-recurrent maxillary sinusitis    -  Primary    Relevant Medications    amoxicillin-clavulanate (AUGMENTIN) 875-125 MG per tablet    Other Relevant Orders    POCT SARS-COV-2 Antigen/Flu Antigen Panel via [facility] (Completed)    Acute pharyngitis, unspecified etiology        Relevant Orders    POCT Rapid strep A (Completed)            This is a 44 year old year-old female who presents with sore throat, maxillary sinus infection.    Instructions provided as documented in the AVS.    Thank you for visiting [facility].  Please follow up with your PCP as needed if symptoms worsen or persist.     "    nadolol (CORGARD) 40 mg tablet Take 0.5 tablets by mouth at bedtime.  0     nortriptyline 50 mg capsule Take 2 capsules by mouth at bedtime. 180 capsule 3     oxyCODONE 10 mg tablet 2 tablets tid and one tablet at hs 210 tablet 0     pantoprazole (PROTONIX) 40 mg delayed-release tablet Take 1 tablet by mouth once daily. 90 tablet 3     PROPYLENE GLYCOL OPHT Place 1-2 Drops into both ears 3 times daily if needed.       warfarin (COUMADIN) 2.5 mg tablet Take 2.5 mg x six days/week and 1.25 mg x one day/week. Or as directed by [facility] 100 tablet 4     No current facility-administered medications for this visit.      Medications have been reviewed by me and are current to the best of my knowledge and ability.      Allergies as of 06/28/2017 - Mack as Reviewed 06/28/2017      Allergen  Reaction Noted     Erythromycin Rash 12/13/2012     Sulfa (sulfonamide antibiotics) Rash 12/13/2012        OBJECTIVE:  /68  Pulse (!) 104  Ht 1.676 m (5' 6\")  Wt 69.4 kg (153 lb)  Breastfeeding? No  BMI 24.69 kg/m2  EXAM:  General Appearance: Pleasant, alert, appropriate appearance for age. No acute distress  Musculoskeletal Exam: There is mild atrophy of the right lower leg with in turning of the right foot.  Neurologic Exam: No new focal findings  Psychiatric Exam: Alert and oriented, appropriate affect.      ASSESSMENT/PLAN:  Multiple problems which appear stable. Dr. Hayden is managing her leg wound from her vasculitis. She will be seeing him on July 5. She will alternate 1.25 of warfarin with 2.5 every other day and go to the [facility] clinic on July 5.    Regarding her chronic pain a prescription was given for 1 month worth of her oxycodone which she takes 7 tablets daily, for morphine equivalents of 105.  was reviewed. Controlled substance contract is up-to-date. Toxicology screen was done June 1 and was appropriate. This can be filled on June 30 and so she will be due for a refill at the end of July. " Other medications will remain the same. We will follow-up again at the end of July.    A prescription was given for a brace for her right foot drop. She may need a new wheelchair and so will be seen back for a mobility evaluation for that if needed.  David Cottrell MD ....................  6/28/2017   12:23 PM